# Patient Record
Sex: FEMALE | Race: WHITE | URBAN - METROPOLITAN AREA
[De-identification: names, ages, dates, MRNs, and addresses within clinical notes are randomized per-mention and may not be internally consistent; named-entity substitution may affect disease eponyms.]

---

## 2022-08-16 LAB
C. TRACHOMATIS, EXTERNAL RESULT: NEGATIVE
N. GONORRHOEAE, EXTERNAL RESULT: NEGATIVE

## 2022-09-01 LAB
ABO, EXTERNAL RESULT: NORMAL
HEP B, EXTERNAL RESULT: NEGATIVE
HIV, EXTERNAL RESULT: NON REACTIVE
RH FACTOR, EXTERNAL RESULT: POSITIVE
RPR, EXTERNAL RESULT: NON REACTIVE
RUBELLA TITER, EXTERNAL RESULT: NORMAL

## 2023-02-23 ENCOUNTER — HOSPITAL ENCOUNTER (OUTPATIENT)
Age: 33
Discharge: HOME | End: 2023-02-23
Attending: STUDENT IN AN ORGANIZED HEALTH CARE EDUCATION/TRAINING PROGRAM | Admitting: STUDENT IN AN ORGANIZED HEALTH CARE EDUCATION/TRAINING PROGRAM
Payer: COMMERCIAL

## 2023-02-23 VITALS
SYSTOLIC BLOOD PRESSURE: 101 MMHG | DIASTOLIC BLOOD PRESSURE: 57 MMHG | BODY MASS INDEX: 33.34 KG/M2 | RESPIRATION RATE: 18 BRPM | WEIGHT: 220 LBS | HEIGHT: 68 IN | HEART RATE: 89 BPM

## 2023-02-23 LAB
A/G RATIO: 1.2 (ref 1.1–2.2)
ALBUMIN SERPL-MCNC: 3.6 G/DL (ref 3.4–5)
ALP BLD-CCNC: 144 U/L (ref 40–129)
ALT SERPL-CCNC: 7 U/L (ref 10–40)
ANION GAP SERPL CALCULATED.3IONS-SCNC: 12 MMOL/L (ref 3–16)
AST SERPL-CCNC: 13 U/L (ref 15–37)
BACTERIA: ABNORMAL /HPF
BASOPHILS ABSOLUTE: 0 K/UL (ref 0–0.2)
BASOPHILS RELATIVE PERCENT: 0.1 %
BILIRUB SERPL-MCNC: <0.2 MG/DL (ref 0–1)
BILIRUBIN URINE: NEGATIVE
BLOOD, URINE: NEGATIVE
BUN BLDV-MCNC: 8 MG/DL (ref 7–20)
CALCIUM SERPL-MCNC: 9.8 MG/DL (ref 8.3–10.6)
CHLORIDE BLD-SCNC: 101 MMOL/L (ref 99–110)
CLARITY: ABNORMAL
CO2: 19 MMOL/L (ref 21–32)
COLOR: YELLOW
CREAT SERPL-MCNC: <0.5 MG/DL (ref 0.6–1.1)
CREATININE URINE: 211.2 MG/DL (ref 28–259)
CRYSTALS, UA: ABNORMAL /HPF
EOSINOPHILS ABSOLUTE: 0.1 K/UL (ref 0–0.6)
EOSINOPHILS RELATIVE PERCENT: 0.6 %
EPITHELIAL CELLS, UA: ABNORMAL /HPF (ref 0–5)
GFR SERPL CREATININE-BSD FRML MDRD: >60 ML/MIN/{1.73_M2}
GLUCOSE BLD-MCNC: 99 MG/DL (ref 70–99)
GLUCOSE URINE: NEGATIVE MG/DL
HCT VFR BLD CALC: 35.5 % (ref 36–48)
HEMOGLOBIN: 11.7 G/DL (ref 12–16)
KETONES, URINE: ABNORMAL MG/DL
LEUKOCYTE ESTERASE, URINE: NEGATIVE
LYMPHOCYTES ABSOLUTE: 1.6 K/UL (ref 1–5.1)
LYMPHOCYTES RELATIVE PERCENT: 12.9 %
MCH RBC QN AUTO: 29.9 PG (ref 26–34)
MCHC RBC AUTO-ENTMCNC: 33.1 G/DL (ref 31–36)
MCV RBC AUTO: 90.2 FL (ref 80–100)
MICROSCOPIC EXAMINATION: YES
MONOCYTES ABSOLUTE: 0.8 K/UL (ref 0–1.3)
MONOCYTES RELATIVE PERCENT: 6.4 %
NEUTROPHILS ABSOLUTE: 9.8 K/UL (ref 1.7–7.7)
NEUTROPHILS RELATIVE PERCENT: 80 %
NITRITE, URINE: NEGATIVE
PDW BLD-RTO: 13.5 % (ref 12.4–15.4)
PH UA: 6 (ref 5–8)
PLATELET # BLD: 276 K/UL (ref 135–450)
PMV BLD AUTO: 7.2 FL (ref 5–10.5)
POTASSIUM SERPL-SCNC: 3.9 MMOL/L (ref 3.5–5.1)
PROTEIN PROTEIN: 49 MG/DL
PROTEIN UA: 30 MG/DL
PROTEIN/CREAT RATIO: 0.2 MG/DL
RBC # BLD: 3.93 M/UL (ref 4–5.2)
RBC UA: ABNORMAL /HPF (ref 0–4)
SODIUM BLD-SCNC: 132 MMOL/L (ref 136–145)
SPECIFIC GRAVITY UA: >=1.03 (ref 1–1.03)
TOTAL PROTEIN: 6.6 G/DL (ref 6.4–8.2)
URIC ACID, SERUM: 3.4 MG/DL (ref 2.6–6)
URINE TYPE: ABNORMAL
UROBILINOGEN, URINE: 0.2 E.U./DL
WBC # BLD: 12.2 K/UL (ref 4–11)
WBC UA: ABNORMAL /HPF (ref 0–5)

## 2023-02-23 PROCEDURE — 85025 COMPLETE CBC W/AUTO DIFF WBC: CPT

## 2023-02-23 PROCEDURE — 84156 ASSAY OF PROTEIN URINE: CPT

## 2023-02-23 PROCEDURE — 84550 ASSAY OF BLOOD/URIC ACID: CPT

## 2023-02-23 PROCEDURE — 81001 URINALYSIS AUTO W/SCOPE: CPT

## 2023-02-23 PROCEDURE — 80053 COMPREHEN METABOLIC PANEL: CPT

## 2023-02-23 PROCEDURE — 82570 ASSAY OF URINE CREATININE: CPT

## 2023-02-23 NOTE — DISCHARGE INSTRUCTIONS
Call OB Provider or return to L&D for increased swelling, right upper quadrant pain, visual disturbances, headache unrelieved by Tylenol or any other concerns. Follow up at next scheduled appointment. Call MD if your contractions are at least 5 minutes apart and increasing in strength. Call MD if your water breaks, don't feel the baby move or have vaginal bleeding. Drink plenty of water and rest on your left side.

## 2023-02-23 NOTE — FLOWSHEET NOTE
Patient presents to triage with having increased BP at the office .   Serial BP started and labs sent

## 2023-02-23 NOTE — H&P
Department of Obstetrics and Gynecology   Obstetrics Triage Note    CHIEF COMPLAINT:  elevated blood pressure    HISTORY OF PRESENT ILLNESS:      The patient is a 29 y.o.  at 33w4d presents to triage for [facility] evaluation due to mild range BP in office. Patient denies complaints. Denies H/A, vision changes, RUQ abdominal or epigastric pain, swelling. Estimated Due Date: Estimated Date of Delivery: 23    PRENATAL CARE:    Complicated by: none    PAST OB HISTORY:  OB History          1    Para        Term                AB        Living             SAB        IAB        Ectopic        Molar        Multiple        Live Births                  Past Medical History:    History reviewed. No pertinent past medical history. Past Surgical History:    History reviewed. No pertinent surgical history. Allergies:  Patient has no known allergies. Social History:    Social History     Socioeconomic History    Marital status:      Spouse name: Not on file    Number of children: Not on file    Years of education: Not on file    Highest education level: Not on file   Occupational History    Not on file   Tobacco Use    Smoking status: Never     Passive exposure: Never    Smokeless tobacco: Never   Vaping Use    Vaping Use: Never used   Substance and Sexual Activity    Alcohol use: Never    Drug use: Never    Sexual activity: Yes     Partners: Male   Other Topics Concern    Not on file   Social History Narrative    Not on file     Social Determinants of Health     Financial Resource Strain: Not on file   Food Insecurity: Not on file   Transportation Needs: Not on file   Physical Activity: Not on file   Stress: Not on file   Social Connections: Not on file   Intimate Partner Violence: Not on file   Housing Stability: Not on file     Family History:   History reviewed. No pertinent family history.   Medications Prior to Admission:  Medications Prior to Admission: Prenatal Vit-Fe Fumarate-FA (PRENATAL 1+1 PO), Take by mouth    REVIEW OF SYSTEMS:  Denies fever, chills, dizziness, CP, SOB, N/V/D, constipation, dysuria, blood in the urine or stool      PHYSICAL EXAM:  Vitals:    02/23/23 1220 02/23/23 1229 02/23/23 1239 02/23/23 1250   BP: 118/65 114/62 112/61 (!) 111/58   Pulse: 96 97 98 96   Resp: 18      Weight: 220 lb (99.8 kg)      Height: 5' 8\" (1.727 m)        General appearance:  awake, alert, cooperative, no apparent distress, and appears stated age  Neurologic:  Awake, alert, oriented to name, place and time. Lungs:  No increased work of breathing, good air exchange  Abdomen:  Soft, non tender, gravid, consistent with her gestational age  Fetal heart rate:  Reassuring.   Cervix: deferred  Contraction frequency:  none  Membranes:  Intact    Labs: CBC with Differential:    Lab Results   Component Value Date/Time    WBC 12.2 02/23/2023 12:15 PM    RBC 3.93 02/23/2023 12:15 PM    HGB 11.7 02/23/2023 12:15 PM    HCT 35.5 02/23/2023 12:15 PM     02/23/2023 12:15 PM    MCV 90.2 02/23/2023 12:15 PM    MCH 29.9 02/23/2023 12:15 PM    MCHC 33.1 02/23/2023 12:15 PM    RDW 13.5 02/23/2023 12:15 PM    LYMPHOPCT 12.9 02/23/2023 12:15 PM    MONOPCT 6.4 02/23/2023 12:15 PM    BASOPCT 0.1 02/23/2023 12:15 PM    MONOSABS 0.8 02/23/2023 12:15 PM    LYMPHSABS 1.6 02/23/2023 12:15 PM    EOSABS 0.1 02/23/2023 12:15 PM    BASOSABS 0.0 02/23/2023 12:15 PM     CMP:    Lab Results   Component Value Date/Time     02/23/2023 12:15 PM    K 3.9 02/23/2023 12:15 PM     02/23/2023 12:15 PM    CO2 19 02/23/2023 12:15 PM    BUN 8 02/23/2023 12:15 PM    CREATININE <0.5 02/23/2023 12:15 PM    AGRATIO 1.2 02/23/2023 12:15 PM    LABGLOM >60 02/23/2023 12:15 PM    GLUCOSE 99 02/23/2023 12:15 PM    PROT 6.6 02/23/2023 12:15 PM    LABALBU 3.6 02/23/2023 12:15 PM    CALCIUM 9.8 02/23/2023 12:15 PM    BILITOT <0.2 02/23/2023 12:15 PM    ALKPHOS 144 02/23/2023 12:15 PM    AST 13 02/23/2023 12:15 PM    ALT 7 02/23/2023 12:15 PM P/C= 0.2    ASSESSMENT AND PLAN:  28 yo  at 34w0d who presents for [address] [facility] evaluation    -Bps wnl in triage, [address] [facility] labs wnl  - given PIH precautions  - f/u in office with routine prenatal care    Flakita Salazar MD

## 2023-02-23 NOTE — FLOWSHEET NOTE
Pt verbalized understanding of verbal and written discharge instructions and denies having questions at this time. Pt left OB unit at 1356   ambulatory, undelivered, and in stable condition, accompanied by spouse. Patient is not in active labor.

## 2023-03-15 LAB — GBS, EXTERNAL RESULT: NEGATIVE

## 2023-03-28 ENCOUNTER — HOSPITAL ENCOUNTER (INPATIENT)
Age: 33
LOS: 3 days | Discharge: HOME | End: 2023-03-31
Attending: OBSTETRICS & GYNECOLOGY | Admitting: OBSTETRICS & GYNECOLOGY
Payer: COMMERCIAL

## 2023-03-28 ENCOUNTER — ANESTHESIA (OUTPATIENT)
Dept: LABOR AND DELIVERY | Age: 33
End: 2023-03-28
Payer: COMMERCIAL

## 2023-03-28 ENCOUNTER — ANESTHESIA EVENT (OUTPATIENT)
Dept: LABOR AND DELIVERY | Age: 33
End: 2023-03-28
Payer: COMMERCIAL

## 2023-03-28 PROBLEM — Z37.9 NORMAL LABOR: Status: ACTIVE | Noted: 2023-03-28

## 2023-03-28 LAB
ABO + RH BLD: NORMAL
AMPHETAMINES UR QL SCN>1000 NG/ML: NORMAL
BARBITURATES UR QL SCN>200 NG/ML: NORMAL
BASOPHILS # BLD: 0 K/UL (ref 0–0.2)
BASOPHILS NFR BLD: 0.2 %
BENZODIAZ UR QL SCN>200 NG/ML: NORMAL
BLD GP AB SCN SERPL QL: NORMAL
BUPRENORPHINE+NOR UR QL SCN: NORMAL
CANNABINOIDS UR QL SCN>50 NG/ML: NORMAL
COCAINE UR QL SCN: NORMAL
DEPRECATED RDW RBC AUTO: 13.8 % (ref 12.4–15.4)
DRUG SCREEN COMMENT UR-IMP: NORMAL
EOSINOPHIL # BLD: 0 K/UL (ref 0–0.6)
EOSINOPHIL NFR BLD: 0.2 %
FENTANYL SCREEN, URINE: NORMAL
HCT VFR BLD AUTO: 36.2 % (ref 36–48)
HGB BLD-MCNC: 12.3 G/DL (ref 12–16)
LYMPHOCYTES # BLD: 1.1 K/UL (ref 1–5.1)
LYMPHOCYTES NFR BLD: 6 %
MCH RBC QN AUTO: 30.1 PG (ref 26–34)
MCHC RBC AUTO-ENTMCNC: 33.9 G/DL (ref 31–36)
MCV RBC AUTO: 88.8 FL (ref 80–100)
METHADONE UR QL SCN>300 NG/ML: NORMAL
MONOCYTES # BLD: 1 K/UL (ref 0–1.3)
MONOCYTES NFR BLD: 5.6 %
NEUTROPHILS # BLD: 15.7 K/UL (ref 1.7–7.7)
NEUTROPHILS NFR BLD: 88 %
OPIATES UR QL SCN>300 NG/ML: NORMAL
OXYCODONE UR QL SCN: NORMAL
PCP UR QL SCN>25 NG/ML: NORMAL
PH UR STRIP: 5 [PH]
PLATELET # BLD AUTO: 224 K/UL (ref 135–450)
PMV BLD AUTO: 7.9 FL (ref 5–10.5)
RBC # BLD AUTO: 4.08 M/UL (ref 4–5.2)
REAGIN+T PALLIDUM IGG+IGM SERPL-IMP: NORMAL
WBC # BLD AUTO: 17.8 K/UL (ref 4–11)

## 2023-03-28 PROCEDURE — 0DQP0ZZ REPAIR RECTUM, OPEN APPROACH: ICD-10-PCS | Performed by: OBSTETRICS & GYNECOLOGY

## 2023-03-28 PROCEDURE — 85025 COMPLETE CBC W/AUTO DIFF WBC: CPT

## 2023-03-28 PROCEDURE — 2580000003 HC RX 258: Performed by: OBSTETRICS & GYNECOLOGY

## 2023-03-28 PROCEDURE — 2500000003 HC RX 250 WO HCPCS: Performed by: OBSTETRICS & GYNECOLOGY

## 2023-03-28 PROCEDURE — 6360000002 HC RX W HCPCS: Performed by: OBSTETRICS & GYNECOLOGY

## 2023-03-28 PROCEDURE — 1220000000 HC SEMI PRIVATE OB R&B

## 2023-03-28 PROCEDURE — 86780 TREPONEMA PALLIDUM: CPT

## 2023-03-28 PROCEDURE — 6360000002 HC RX W HCPCS

## 2023-03-28 PROCEDURE — 80307 DRUG TEST PRSMV CHEM ANLYZR: CPT

## 2023-03-28 PROCEDURE — 86901 BLOOD TYPING SEROLOGIC RH(D): CPT

## 2023-03-28 PROCEDURE — 2500000003 HC RX 250 WO HCPCS: Performed by: NURSE ANESTHETIST, CERTIFIED REGISTERED

## 2023-03-28 PROCEDURE — 86850 RBC ANTIBODY SCREEN: CPT

## 2023-03-28 PROCEDURE — 86900 BLOOD TYPING SEROLOGIC ABO: CPT

## 2023-03-28 RX ORDER — BUPIVACAINE HYDROCHLORIDE 2.5 MG/ML
INJECTION, SOLUTION EPIDURAL; INFILTRATION; INTRACAUDAL PRN
Status: DISCONTINUED | OUTPATIENT
Start: 2023-03-28 | End: 2023-03-29 | Stop reason: SDUPTHER

## 2023-03-28 RX ORDER — LIDOCAINE HYDROCHLORIDE 20 MG/ML
INJECTION, SOLUTION EPIDURAL; INFILTRATION; INTRACAUDAL; PERINEURAL PRN
Status: DISCONTINUED | OUTPATIENT
Start: 2023-03-28 | End: 2023-03-29 | Stop reason: SDUPTHER

## 2023-03-28 RX ORDER — MISOPROSTOL 100 UG/1
800 TABLET ORAL PRN
Status: DISCONTINUED | OUTPATIENT
Start: 2023-03-28 | End: 2023-03-29

## 2023-03-28 RX ORDER — FAMOTIDINE 10 MG/ML
20 INJECTION, SOLUTION INTRAVENOUS 2 TIMES DAILY
Status: DISCONTINUED | OUTPATIENT
Start: 2023-03-28 | End: 2023-03-29

## 2023-03-28 RX ORDER — METHYLERGONOVINE MALEATE 0.2 MG/ML
200 INJECTION INTRAVENOUS PRN
Status: DISCONTINUED | OUTPATIENT
Start: 2023-03-28 | End: 2023-03-29

## 2023-03-28 RX ORDER — BUTORPHANOL TARTRATE 1 MG/ML
INJECTION, SOLUTION INTRAMUSCULAR; INTRAVENOUS
Status: COMPLETED
Start: 2023-03-28 | End: 2023-03-28

## 2023-03-28 RX ORDER — ONDANSETRON 2 MG/ML
4 INJECTION INTRAMUSCULAR; INTRAVENOUS EVERY 6 HOURS PRN
Status: DISCONTINUED | OUTPATIENT
Start: 2023-03-28 | End: 2023-03-29

## 2023-03-28 RX ORDER — SODIUM CHLORIDE, SODIUM LACTATE, POTASSIUM CHLORIDE, CALCIUM CHLORIDE 600; 310; 30; 20 MG/100ML; MG/100ML; MG/100ML; MG/100ML
INJECTION, SOLUTION INTRAVENOUS CONTINUOUS
Status: DISCONTINUED | OUTPATIENT
Start: 2023-03-28 | End: 2023-03-29

## 2023-03-28 RX ORDER — SODIUM CHLORIDE, SODIUM LACTATE, POTASSIUM CHLORIDE, AND CALCIUM CHLORIDE .6; .31; .03; .02 G/100ML; G/100ML; G/100ML; G/100ML
1000 INJECTION, SOLUTION INTRAVENOUS PRN
Status: DISCONTINUED | OUTPATIENT
Start: 2023-03-28 | End: 2023-03-29

## 2023-03-28 RX ORDER — ACETAMINOPHEN 325 MG/1
650 TABLET ORAL EVERY 4 HOURS PRN
Status: DISCONTINUED | OUTPATIENT
Start: 2023-03-28 | End: 2023-03-29

## 2023-03-28 RX ORDER — SODIUM CHLORIDE, SODIUM LACTATE, POTASSIUM CHLORIDE, AND CALCIUM CHLORIDE .6; .31; .03; .02 G/100ML; G/100ML; G/100ML; G/100ML
500 INJECTION, SOLUTION INTRAVENOUS PRN
Status: DISCONTINUED | OUTPATIENT
Start: 2023-03-28 | End: 2023-03-29

## 2023-03-28 RX ORDER — BUTORPHANOL TARTRATE 1 MG/ML
1 INJECTION, SOLUTION INTRAMUSCULAR; INTRAVENOUS
Status: DISCONTINUED | OUTPATIENT
Start: 2023-03-28 | End: 2023-03-29

## 2023-03-28 RX ORDER — CARBOPROST TROMETHAMINE 250 UG/ML
250 INJECTION, SOLUTION INTRAMUSCULAR PRN
Status: DISCONTINUED | OUTPATIENT
Start: 2023-03-28 | End: 2023-03-29

## 2023-03-28 RX ORDER — SODIUM CHLORIDE 0.9 % (FLUSH) 0.9 %
5-40 SYRINGE (ML) INJECTION PRN
Status: DISCONTINUED | OUTPATIENT
Start: 2023-03-28 | End: 2023-03-29

## 2023-03-28 RX ORDER — SODIUM CHLORIDE 9 MG/ML
25 INJECTION, SOLUTION INTRAVENOUS PRN
Status: DISCONTINUED | OUTPATIENT
Start: 2023-03-28 | End: 2023-03-29

## 2023-03-28 RX ORDER — SODIUM CHLORIDE 0.9 % (FLUSH) 0.9 %
5-40 SYRINGE (ML) INJECTION EVERY 12 HOURS SCHEDULED
Status: DISCONTINUED | OUTPATIENT
Start: 2023-03-28 | End: 2023-03-29

## 2023-03-28 RX ORDER — DIPHENHYDRAMINE HCL 25 MG
25 TABLET ORAL EVERY 4 HOURS PRN
Status: DISCONTINUED | OUTPATIENT
Start: 2023-03-28 | End: 2023-03-29

## 2023-03-28 RX ORDER — BUPIVACAINE HYDROCHLORIDE 2.5 MG/ML
INJECTION, SOLUTION EPIDURAL; INFILTRATION; INTRACAUDAL
Status: COMPLETED
Start: 2023-03-28 | End: 2023-03-28

## 2023-03-28 RX ORDER — BUPIVACAINE HYDROCHLORIDE 5 MG/ML
INJECTION, SOLUTION EPIDURAL; INTRACAUDAL PRN
Status: DISCONTINUED | OUTPATIENT
Start: 2023-03-28 | End: 2023-03-29 | Stop reason: SDUPTHER

## 2023-03-28 RX ADMIN — Medication 15 ML/HR: at 13:53

## 2023-03-28 RX ADMIN — BUPIVACAINE HYDROCHLORIDE 3 ML: 5 INJECTION, SOLUTION EPIDURAL; INTRACAUDAL at 23:42

## 2023-03-28 RX ADMIN — BUTORPHANOL TARTRATE 1 MG: 1 INJECTION, SOLUTION INTRAMUSCULAR; INTRAVENOUS at 10:12

## 2023-03-28 RX ADMIN — SODIUM CHLORIDE, POTASSIUM CHLORIDE, SODIUM LACTATE AND CALCIUM CHLORIDE: 600; 310; 30; 20 INJECTION, SOLUTION INTRAVENOUS at 16:31

## 2023-03-28 RX ADMIN — Medication 1 MILLI-UNITS/MIN: at 20:30

## 2023-03-28 RX ADMIN — BUPIVACAINE HYDROCHLORIDE 1 ML: 2.5 INJECTION, SOLUTION EPIDURAL; INFILTRATION; INTRACAUDAL; PERINEURAL at 13:49

## 2023-03-28 RX ADMIN — BUPIVACAINE HYDROCHLORIDE 3 ML: 2.5 INJECTION, SOLUTION EPIDURAL; INFILTRATION; INTRACAUDAL at 23:42

## 2023-03-28 RX ADMIN — LIDOCAINE HYDROCHLORIDE 2 ML: 20 INJECTION, SOLUTION EPIDURAL; INFILTRATION; INTRACAUDAL; PERINEURAL at 23:42

## 2023-03-28 RX ADMIN — SODIUM CHLORIDE, POTASSIUM CHLORIDE, SODIUM LACTATE AND CALCIUM CHLORIDE: 600; 310; 30; 20 INJECTION, SOLUTION INTRAVENOUS at 11:58

## 2023-03-28 RX ADMIN — FAMOTIDINE 20 MG: 10 INJECTION, SOLUTION INTRAVENOUS at 19:55

## 2023-03-28 ASSESSMENT — PAIN SCALES - GENERAL: PAINLEVEL_OUTOF10: 8

## 2023-03-28 ASSESSMENT — PAIN DESCRIPTION - LOCATION: LOCATION: VAGINA;PELVIS

## 2023-03-28 ASSESSMENT — PAIN DESCRIPTION - DESCRIPTORS: DESCRIPTORS: CRAMPING;PRESSURE

## 2023-03-28 NOTE — PROGRESS NOTES
The pt's ultrasound monitor has been adjusted several times, and was recently changed out. On the FHR tracing, it appears to trace a normal FHR and then traces a HR in the 60's. This RN has been at the bedside several times throughout the tracing, and no audible decelerations have been heard. This RN notified the charge RN.

## 2023-03-28 NOTE — H&P
Department of Obstetrics and Gynecology   Obstetrics History and Physical        CHIEF COMPLAINT:  leakage of amniotic fluid    HISTORY OF PRESENT ILLNESS:      The patient is a 29 y.o. female at 36w4d. OB History          1    Para        Term                AB        Living             SAB        IAB        Ectopic        Molar        Multiple        Live Births                Patient presents with a chief complaint as above and is being admitted for active phase labor. U/s at 36 weeks showed efw 3900g. Estimated Due Date: Estimated Date of Delivery: 23    PRENATAL CARE:    Complicated by: none    PAST OB HISTORY:  OB History          1    Para        Term                AB        Living             SAB        IAB        Ectopic        Molar        Multiple        Live Births                    Past Medical History:    History reviewed. No pertinent past medical history. Past Surgical History:    History reviewed. No pertinent surgical history. Allergies:  Patient has no known allergies.     Social History:    Social History     Socioeconomic History    Marital status:      Spouse name: Not on file    Number of children: Not on file    Years of education: Not on file    Highest education level: Not on file   Occupational History    Not on file   Tobacco Use    Smoking status: Never     Passive exposure: Never    Smokeless tobacco: Never   Vaping Use    Vaping Use: Never used   Substance and Sexual Activity    Alcohol use: Never    Drug use: Never    Sexual activity: Yes     Partners: Male   Other Topics Concern    Not on file   Social History Narrative    Not on file     Social Determinants of Health     Financial Resource Strain: Not on file   Food Insecurity: Not on file   Transportation Needs: Not on file   Physical Activity: Not on file   Stress: Not on file   Social Connections: Not on file   Intimate Partner Violence: Not on file   Housing Stability: Not on file Family History:   History reviewed. No pertinent family history. Medications Prior to Admission:  Medications Prior to Admission: Prenatal Vit-Fe Fumarate-FA (PRENATAL 1+1 PO), Take by mouth    REVIEW OF SYSTEMS:    wnl    PHYSICAL EXAM:  Vitals:    03/28/23 0733   BP: 134/89   Pulse: (!) 116   Resp: 18   Weight: 220 lb (99.8 kg)   Height: 5' 8\" (1.727 m)     General appearance:  awake, alert, cooperative, no apparent distress, and appears stated age  Neurologic:  Awake, alert, oriented to name, place and time. Lungs:  No increased work of breathing, good air exchange  Abdomen:  Soft, non tender, gravid, consistent with her gestational age, EFW by Leopold's maneuver was 4000g   Fetal heart rate:  Reassuring.   Pelvis:  Adequate pelvis  Cervix: 1/80/-2  Contraction frequency:  q 7 min  Membranes:  Ruptured clear fluid    Labs: CBC:   Lab Results   Component Value Date/Time    WBC 12.2 02/23/2023 12:15 PM    RBC 3.93 02/23/2023 12:15 PM    HGB 11.7 02/23/2023 12:15 PM    HCT 35.5 02/23/2023 12:15 PM    MCV 90.2 02/23/2023 12:15 PM    MCH 29.9 02/23/2023 12:15 PM    MCHC 33.1 02/23/2023 12:15 PM    RDW 13.5 02/23/2023 12:15 PM     02/23/2023 12:15 PM    MPV 7.2 02/23/2023 12:15 PM       ASSESSMENT AND PLAN:    Labor: Admit, anticipate normal delivery, routine labor orders  Fetus: Reassuring  GBS: No  Other: expectant management

## 2023-03-28 NOTE — PROGRESS NOTES
Report received from KAMALJIT Graves. Bedside report given. Introduced myself to pt as her RN for the day. I put my name and phone number on the white board and showed pt how to use her room phone to get a hold of me. Pt was given her plan of care for the day. Call light within reach. Bed in lowest position and wheels are locked. Pt verbalized understanding and denies any further needs at this time. Continue to monitor.

## 2023-03-28 NOTE — PROGRESS NOTES
Monitor stations switched out to see if this resolves the problem. Saranya Garcia, charge RN at the bedside.

## 2023-03-28 NOTE — ANESTHESIA PROCEDURE NOTES
Epidural Block    Patient location during procedure: OB  Start time: 3/28/2023 1:40 PM  End time: 3/28/2023 1:49 PM  Reason for block: labor epidural  Staffing  Anesthesiologist: Oscar Irizarry MD  Resident/CRNA: Karina Floor, APRN - CRNA  Epidural  Patient position: sitting  Prep: Betadine  Patient monitoring: cardiac monitor, continuous pulse ox and frequent blood pressure checks  Approach: midline  Location: L2-3  Injection technique: KAREEM saline  Provider prep: mask and sterile gloves  Needle  Needle type: Tuohy   Needle gauge: 17 G  Needle length: 3.5 in  Catheter type: multi-orifice  Catheter size: 19 G (20 G)  Test dose: negative (3 cc of 1,5 % xylocaine with epi)Catheter Secured: tegaderm  Assessment  Sensory level: T8  Hemodynamics: stable  Attempts: 1  Outcomes: uncomplicated and patient tolerated procedure well  Additional Notes  Pt. prepped and draped in sterile fashion. Skin wheal with 1% lidocaine. 17ga touhy needle to KAREEM. 25 ga. Spinal needle per touhy. CSF visualized in hub and 1 cc of 0.25 % bupivacaine injected. Needle withdrawn and catheter threaded. Negative test dose. Catheter secured with sterile dressing.    Preanesthetic Checklist  Completed: patient identified, IV checked, risks and benefits discussed, equipment checked, pre-op evaluation, timeout performed, anesthesia consent given, oxygen available and monitors applied/VS acknowledged

## 2023-03-28 NOTE — PROGRESS NOTES
Dr. Marylin Reddy at pt bedside. FHR tracing and contraction pattern reviewed. SVE done. Pt is 3-4 cm, 80% effaced and -1 station. IUPC placed at this time.  Continue to monitor

## 2023-03-28 NOTE — FLOWSHEET NOTE
Patient presents to triage with c/o SROM at 0500 and contractions worsening on her drive into triage. Rates pain 8/10. Placed on EFM and VSS.   Updated Dr Nikki Baldwin of patients arrival

## 2023-03-28 NOTE — PROGRESS NOTES
Department of Obstetrics and Gynecology  Labor and Delivery   Attending Progress Note      SUBJECTIVE:  pt reports increasing contractions and received epidural.      OBJECTIVE:      Fetal heart rate:       Baseline Heart Rate:  140        Accelerations:  present       Long Term Variability:  moderate       Decelerations:  absent         Contraction frequency: 5 minutes    Membranes:  Ruptured clear fluid    Cervix:         Dilation:  4 cm         Effacement:  75%         Station:  -1         Position:  anterior             ASSESSMENT & PLAN:    Consider pitocin  IUPC placed

## 2023-03-28 NOTE — ANESTHESIA PRE PROCEDURE
Department of Anesthesiology  Preprocedure Note       Name:  Farooq Stewart   Age:  29 y.o.  :  1990                                          MRN:  [id number]         Date:  3/28/2023      Surgeon: * No surgeons listed *    Procedure: * No procedures listed *    Medications prior to admission:   Prior to Admission medications    Medication Sig Start Date End Date Taking?  Authorizing Provider   Prenatal Vit-Fe Fumarate-FA (PRENATAL 1+1 PO) Take by mouth    Historical Provider, MD       Current medications:    Current Facility-Administered Medications   Medication Dose Route Frequency Provider Last Rate Last Admin    lactated ringers IV soln infusion   IntraVENous Continuous Yvonne Vásquez  mL/hr at 23 1158 New Bag at 23 1158    lactated ringers bolus  500 mL IntraVENous PRN Yvonne Vásquez MD        Or   Peyman Epps lactated ringers bolus  1,000 mL IntraVENous PRN Yvonne Vásquez MD        sodium chloride flush 0.9 % injection 5-40 mL  5-40 mL IntraVENous 2 times per day Yvonne Vásquez MD        sodium chloride flush 0.9 % injection 5-40 mL  5-40 mL IntraVENous PRN Yvonne Vásquez MD        0.9 % sodium chloride infusion  25 mL IntraVENous PRN Yvonne Vásquez MD        ondansetron [facility]) injection 4 mg  4 mg IntraVENous Q6H PRN Yvonne Vásquez MD        diphenhydrAMINE (BENADRYL) tablet 25 mg  25 mg Oral Q4H PRN Yvonne Vásquez MD        methylergonovine (METHERGINE) injection 200 mcg  200 mcg IntraMUSCular PRN Yvonne Vásquez MD        carboprost [facility]) injection 250 mcg  250 mcg IntraMUSCular PRN Yvonne Vásquez MD        miSOPROStol (CYTOTEC) tablet 800 mcg  800 mcg Rectal PRN Yvonne Vásquez MD        tranexamic acid (CYKLOKAPRON) 1,000 mg in sodium chloride 0.9 % 100 mL IVPB  1,000 mg IntraVENous Once PRN Yvonne Vásquez MD        acetaminophen (TYLENOL) tablet 650 mg  650 mg Oral Q4H PRN Marianna Adair MD        butorphanol (STADOL) injection 1 mg  1 mg IntraVENous Q3H PRN Marianna Adair MD   1 mg at 03/28/23 1012     Facility-Administered Medications Ordered in Other Encounters   Medication Dose Route Frequency Provider Last Rate Last Admin    bupivacaine (PF) (MARCAINE) 0.25 % injection   IntraSPINal PRN Terrilee Specter, APRN - CRNA   1 mL at 03/28/23 1349    sodium chloride 0.9 % 200 mL with fentaNYL 500 mcg, bupivacaine 0.5% 50 mL (OB) epidural   Epidural Continuous PRN Terrilee Specter, APRN - CRNA 15 mL/hr at 03/28/23 1353 15 mL/hr at 03/28/23 1353       Allergies:  No Known Allergies    Problem List:    Patient Active Problem List   Diagnosis Code    Normal labor O80, Z37.9       Past Medical History:  History reviewed. No pertinent past medical history. Past Surgical History:  History reviewed. No pertinent surgical history. Social History:    Social History     Tobacco Use    Smoking status: Never     Passive exposure: Never    Smokeless tobacco: Never   Substance Use Topics    Alcohol use: Never                                Counseling given: Not Answered      Vital Signs (Current):   Vitals:    03/28/23 1350 03/28/23 1353 03/28/23 1356 03/28/23 1359   BP: 136/68 126/74 128/77 129/74   Pulse: (!) 113 (!) 112 (!) 106 (!) 112   Resp:       Temp:       TempSrc:       SpO2:       Weight:       Height:                                                  BP Readings from Last 3 Encounters:   03/28/23 129/74   02/23/23 (!) 101/57       NPO Status:                                                                                 BMI:   Wt Readings from Last 3 Encounters:   03/28/23 220 lb (99.8 kg)   02/23/23 220 lb (99.8 kg)     Body mass index is 33.45 kg/m².     CBC:   Lab Results   Component Value Date/Time    WBC 17.8 03/28/2023 08:00 AM    RBC 4.08 03/28/2023 08:00 AM    HGB 12.3 03/28/2023 08:00 AM    HCT 36.2 03/28/2023

## 2023-03-29 PROCEDURE — 6360000002 HC RX W HCPCS: Performed by: OBSTETRICS & GYNECOLOGY

## 2023-03-29 PROCEDURE — 2580000003 HC RX 258: Performed by: OBSTETRICS & GYNECOLOGY

## 2023-03-29 PROCEDURE — 7200000001 HC VAGINAL DELIVERY

## 2023-03-29 PROCEDURE — 2500000003 HC RX 250 WO HCPCS: Performed by: NURSE ANESTHETIST, CERTIFIED REGISTERED

## 2023-03-29 PROCEDURE — 1220000000 HC SEMI PRIVATE OB R&B

## 2023-03-29 PROCEDURE — 2500000003 HC RX 250 WO HCPCS: Performed by: OBSTETRICS & GYNECOLOGY

## 2023-03-29 RX ORDER — MORPHINE SULFATE 10 MG/ML
INJECTION, SOLUTION INTRAMUSCULAR; INTRAVENOUS PRN
Status: DISCONTINUED | OUTPATIENT
Start: 2023-03-29 | End: 2023-03-29 | Stop reason: SDUPTHER

## 2023-03-29 RX ORDER — FERROUS SULFATE 325(65) MG
325 TABLET ORAL 2 TIMES DAILY WITH MEALS
Status: DISCONTINUED | OUTPATIENT
Start: 2023-03-30 | End: 2023-03-31 | Stop reason: HOSPADM

## 2023-03-29 RX ORDER — IBUPROFEN 600 MG/1
600 TABLET ORAL EVERY 6 HOURS PRN
Status: DISCONTINUED | OUTPATIENT
Start: 2023-03-30 | End: 2023-03-31 | Stop reason: HOSPADM

## 2023-03-29 RX ORDER — KETOROLAC TROMETHAMINE 30 MG/ML
30 INJECTION, SOLUTION INTRAMUSCULAR; INTRAVENOUS EVERY 6 HOURS
Status: DISPENSED | OUTPATIENT
Start: 2023-03-29 | End: 2023-03-30

## 2023-03-29 RX ORDER — METHYLERGONOVINE MALEATE 0.2 MG/ML
200 INJECTION INTRAVENOUS PRN
Status: DISCONTINUED | OUTPATIENT
Start: 2023-03-29 | End: 2023-03-31 | Stop reason: HOSPADM

## 2023-03-29 RX ORDER — SODIUM CHLORIDE 0.9 % (FLUSH) 0.9 %
5-40 SYRINGE (ML) INJECTION PRN
Status: DISCONTINUED | OUTPATIENT
Start: 2023-03-29 | End: 2023-03-31 | Stop reason: HOSPADM

## 2023-03-29 RX ORDER — ONDANSETRON 2 MG/ML
4 INJECTION INTRAMUSCULAR; INTRAVENOUS EVERY 6 HOURS PRN
Status: DISCONTINUED | OUTPATIENT
Start: 2023-03-29 | End: 2023-03-31 | Stop reason: HOSPADM

## 2023-03-29 RX ORDER — DEXMEDETOMIDINE HYDROCHLORIDE 100 UG/ML
INJECTION, SOLUTION INTRAVENOUS PRN
Status: DISCONTINUED | OUTPATIENT
Start: 2023-03-29 | End: 2023-03-29 | Stop reason: SDUPTHER

## 2023-03-29 RX ORDER — DOCUSATE SODIUM 100 MG/1
100 CAPSULE, LIQUID FILLED ORAL 2 TIMES DAILY
Status: DISCONTINUED | OUTPATIENT
Start: 2023-03-29 | End: 2023-03-31 | Stop reason: HOSPADM

## 2023-03-29 RX ORDER — CEFAZOLIN SODIUM IN 0.9 % NACL 2 G/100 ML
2000 PLASTIC BAG, INJECTION (ML) INTRAVENOUS EVERY 8 HOURS
Status: DISCONTINUED | OUTPATIENT
Start: 2023-03-29 | End: 2023-03-29

## 2023-03-29 RX ORDER — DIPHENHYDRAMINE HYDROCHLORIDE 50 MG/ML
12.5 INJECTION INTRAMUSCULAR; INTRAVENOUS EVERY 6 HOURS PRN
Status: DISCONTINUED | OUTPATIENT
Start: 2023-03-29 | End: 2023-03-31 | Stop reason: HOSPADM

## 2023-03-29 RX ORDER — SODIUM CHLORIDE, SODIUM LACTATE, POTASSIUM CHLORIDE, CALCIUM CHLORIDE 600; 310; 30; 20 MG/100ML; MG/100ML; MG/100ML; MG/100ML
INJECTION, SOLUTION INTRAVENOUS CONTINUOUS
Status: DISCONTINUED | OUTPATIENT
Start: 2023-03-29 | End: 2023-03-31 | Stop reason: HOSPADM

## 2023-03-29 RX ORDER — SODIUM CHLORIDE 0.9 % (FLUSH) 0.9 %
5-40 SYRINGE (ML) INJECTION EVERY 12 HOURS SCHEDULED
Status: DISCONTINUED | OUTPATIENT
Start: 2023-03-29 | End: 2023-03-31 | Stop reason: HOSPADM

## 2023-03-29 RX ORDER — LANOLIN 100 %
OINTMENT (GRAM) TOPICAL PRN
Status: DISCONTINUED | OUTPATIENT
Start: 2023-03-29 | End: 2023-03-31 | Stop reason: HOSPADM

## 2023-03-29 RX ORDER — ACETAMINOPHEN 500 MG
1000 TABLET ORAL EVERY 8 HOURS SCHEDULED
Status: DISCONTINUED | OUTPATIENT
Start: 2023-03-29 | End: 2023-03-31 | Stop reason: HOSPADM

## 2023-03-29 RX ORDER — MORPHINE SULFATE 0.5 MG/ML
INJECTION, SOLUTION EPIDURAL; INTRATHECAL; INTRAVENOUS
Status: DISCONTINUED
Start: 2023-03-29 | End: 2023-03-29

## 2023-03-29 RX ORDER — SODIUM CHLORIDE 9 MG/ML
INJECTION, SOLUTION INTRAVENOUS PRN
Status: DISCONTINUED | OUTPATIENT
Start: 2023-03-29 | End: 2023-03-31 | Stop reason: HOSPADM

## 2023-03-29 RX ADMIN — Medication 15 ML/HR: at 07:15

## 2023-03-29 RX ADMIN — BUPIVACAINE HYDROCHLORIDE 5 ML: 2.5 INJECTION, SOLUTION EPIDURAL; INFILTRATION; INTRACAUDAL at 14:50

## 2023-03-29 RX ADMIN — ONDANSETRON 4 MG: 2 INJECTION INTRAMUSCULAR; INTRAVENOUS at 21:46

## 2023-03-29 RX ADMIN — SODIUM CHLORIDE, POTASSIUM CHLORIDE, SODIUM LACTATE AND CALCIUM CHLORIDE: 600; 310; 30; 20 INJECTION, SOLUTION INTRAVENOUS at 16:10

## 2023-03-29 RX ADMIN — SODIUM CHLORIDE, POTASSIUM CHLORIDE, SODIUM LACTATE AND CALCIUM CHLORIDE: 600; 310; 30; 20 INJECTION, SOLUTION INTRAVENOUS at 08:29

## 2023-03-29 RX ADMIN — BUPIVACAINE HYDROCHLORIDE 3 ML: 5 INJECTION, SOLUTION EPIDURAL; INTRACAUDAL at 06:27

## 2023-03-29 RX ADMIN — DEXMEDETOMIDINE HYDROCHLORIDE 20 MCG: 100 INJECTION, SOLUTION INTRAVENOUS at 17:03

## 2023-03-29 RX ADMIN — BUPIVACAINE HYDROCHLORIDE 8 ML: 5 INJECTION, SOLUTION EPIDURAL; INTRACAUDAL at 10:05

## 2023-03-29 RX ADMIN — BUPIVACAINE HYDROCHLORIDE 8 ML: 5 INJECTION, SOLUTION EPIDURAL; INTRACAUDAL at 17:03

## 2023-03-29 RX ADMIN — Medication 2000 MG: at 21:01

## 2023-03-29 RX ADMIN — Medication 10 ML: at 14:20

## 2023-03-29 RX ADMIN — MORPHINE SULFATE 2.5 MG: 10 INJECTION, SOLUTION INTRAMUSCULAR; INTRAVENOUS at 21:05

## 2023-03-29 RX ADMIN — BUPIVACAINE HYDROCHLORIDE 1 ML: 2.5 INJECTION, SOLUTION EPIDURAL; INFILTRATION; INTRACAUDAL; PERINEURAL at 15:43

## 2023-03-29 RX ADMIN — DEXMEDETOMIDINE HYDROCHLORIDE 20 MCG: 100 INJECTION, SOLUTION INTRAVENOUS at 10:05

## 2023-03-29 RX ADMIN — LIDOCAINE HYDROCHLORIDE 2 ML: 20 INJECTION, SOLUTION EPIDURAL; INFILTRATION; INTRACAUDAL; PERINEURAL at 06:27

## 2023-03-29 RX ADMIN — FAMOTIDINE 20 MG: 10 INJECTION, SOLUTION INTRAVENOUS at 14:20

## 2023-03-29 RX ADMIN — KETOROLAC TROMETHAMINE 30 MG: 30 INJECTION, SOLUTION INTRAMUSCULAR; INTRAVENOUS at 22:22

## 2023-03-29 RX ADMIN — BUPIVACAINE HYDROCHLORIDE 3 ML: 2.5 INJECTION, SOLUTION EPIDURAL; INFILTRATION; INTRACAUDAL at 06:27

## 2023-03-29 RX ADMIN — SODIUM CHLORIDE, POTASSIUM CHLORIDE, SODIUM LACTATE AND CALCIUM CHLORIDE: 600; 310; 30; 20 INJECTION, SOLUTION INTRAVENOUS at 00:30

## 2023-03-29 ASSESSMENT — PAIN SCALES - GENERAL: PAINLEVEL_OUTOF10: 2

## 2023-03-29 ASSESSMENT — PAIN DESCRIPTION - LOCATION: LOCATION: ABDOMEN

## 2023-03-29 NOTE — PROGRESS NOTES
Dr. Dali Richardson contacted regarding elevated blood pressures. This RN also requested bedside ultrasound to be performed to verify vertex presentation. Dr. Dali Richardson ordered house doc to perform bedside ultrasound. No orders regarding blood pressure. 2348: Dr. Tonya Sanchez at bedside to confirm vertex presentation with ultrasound. Vertex presentation confirmed.

## 2023-03-29 NOTE — PROGRESS NOTES
Brief Note    Asked by nursing staff to evaluate fetal presentation with ultrasound. BSUS performed and vertex presentation confirmed at this time.      Italo Lee MD

## 2023-03-29 NOTE — PROGRESS NOTES
Department of Obstetrics and Gynecology  Labor and Delivery   Attending Progress Note      SUBJECTIVE:  pt comfortable with epidural    OBJECTIVE:      Fetal heart rate:       Baseline Heart Rate:  140        Accelerations:  present       Long Term Variability:  moderate       Decelerations:  absent         Contraction frequency: 3 minutes    Membranes:  Ruptured clear fluid    Cervix:         Dilation:  5 cm         Effacement:  75%         Station:  0         Position:  anterior             ASSESSMENT & PLAN:    Continue pitocin augmentation

## 2023-03-29 NOTE — ANESTHESIA PROCEDURE NOTES
CSE Block    Patient location during procedure: OB  Start time: 3/29/2023 3:35 PM  End time: 3/29/2023 3:51 PM  Reason for block: labor epidural  Staffing  Performed: resident/CRNA   Anesthesiologist: Amita Donis DO  Resident/CRNA: Edwin Jovel APRN - CRNA  CSE  Patient position: sitting  Prep: ChloraPrep and site prepped and draped  Patient monitoring: continuous pulse ox and frequent blood pressure checks  Approach: midline  Provider prep: mask and sterile gloves  Spinal Needle  Needle type: pencil-tip   Needle gauge: 25 G  Needle length: 4.75 in  Epidural Needle  Injection technique: KAREEM saline  Needle type: Tuohy   Needle gauge: 17 G  Needle length: 3.5 in  Needle insertion depth: 7 cm  Location: lumbar (1-5)  Catheter  Catheter type: side hole  Catheter size: 19 G  Catheter at skin depth: 12 cm  Test dose: negative  Assessment  Hemodynamics: stable  Additional Notes  Patient in sitting position. Epidural insitu removed with tip intact. Sterile prep and drape applied to back. Skin localization with 3ml of lidocaine 1%. LORT with NS, CSE with 25g pencan- return of clear free flowing csf. Epidural catheter advanced easily. Test dose given incrementally after negative aspirate from catheter. Sterile dressing applied. Patient tolerated procedure well.    Preanesthetic Checklist  Completed: patient identified, IV checked, site marked, risks and benefits discussed, surgical/procedural consents, equipment checked, pre-op evaluation, timeout performed, anesthesia consent given, oxygen available, monitors applied/VS acknowledged and blood product R/B/A discussed and consented

## 2023-03-30 LAB
DEPRECATED RDW RBC AUTO: 14.2 % (ref 12.4–15.4)
HCT VFR BLD AUTO: 33 % (ref 36–48)
HGB BLD-MCNC: 11.1 G/DL (ref 12–16)
MCH RBC QN AUTO: 30.5 PG (ref 26–34)
MCHC RBC AUTO-ENTMCNC: 33.6 G/DL (ref 31–36)
MCV RBC AUTO: 90.7 FL (ref 80–100)
PLATELET # BLD AUTO: 230 K/UL (ref 135–450)
PMV BLD AUTO: 7.8 FL (ref 5–10.5)
RBC # BLD AUTO: 3.64 M/UL (ref 4–5.2)
WBC # BLD AUTO: 24.5 K/UL (ref 4–11)

## 2023-03-30 PROCEDURE — 6360000002 HC RX W HCPCS: Performed by: OBSTETRICS & GYNECOLOGY

## 2023-03-30 PROCEDURE — 85027 COMPLETE CBC AUTOMATED: CPT

## 2023-03-30 PROCEDURE — 36415 COLL VENOUS BLD VENIPUNCTURE: CPT

## 2023-03-30 PROCEDURE — 6370000000 HC RX 637 (ALT 250 FOR IP): Performed by: OBSTETRICS & GYNECOLOGY

## 2023-03-30 PROCEDURE — 2580000003 HC RX 258: Performed by: OBSTETRICS & GYNECOLOGY

## 2023-03-30 PROCEDURE — 1220000000 HC SEMI PRIVATE OB R&B

## 2023-03-30 RX ADMIN — DOCUSATE SODIUM 100 MG: 100 CAPSULE, LIQUID FILLED ORAL at 10:32

## 2023-03-30 RX ADMIN — Medication 10 ML: at 20:52

## 2023-03-30 RX ADMIN — ACETAMINOPHEN 1000 MG: 500 TABLET ORAL at 01:59

## 2023-03-30 RX ADMIN — DOCUSATE SODIUM 100 MG: 100 CAPSULE, LIQUID FILLED ORAL at 02:00

## 2023-03-30 RX ADMIN — KETOROLAC TROMETHAMINE 30 MG: 30 INJECTION, SOLUTION INTRAMUSCULAR; INTRAVENOUS at 04:19

## 2023-03-30 RX ADMIN — ACETAMINOPHEN 1000 MG: 500 TABLET ORAL at 10:32

## 2023-03-30 RX ADMIN — DOCUSATE SODIUM 100 MG: 100 CAPSULE, LIQUID FILLED ORAL at 20:51

## 2023-03-30 RX ADMIN — IBUPROFEN 600 MG: 600 TABLET, FILM COATED ORAL at 20:51

## 2023-03-30 RX ADMIN — IBUPROFEN 600 MG: 600 TABLET, FILM COATED ORAL at 13:24

## 2023-03-30 RX ADMIN — ACETAMINOPHEN 1000 MG: 500 TABLET ORAL at 18:44

## 2023-03-30 ASSESSMENT — PAIN SCALES - GENERAL
PAINLEVEL_OUTOF10: 1
PAINLEVEL_OUTOF10: 2
PAINLEVEL_OUTOF10: 1
PAINLEVEL_OUTOF10: 1
PAINLEVEL_OUTOF10: 2

## 2023-03-30 ASSESSMENT — PAIN DESCRIPTION - LOCATION
LOCATION: PERINEUM
LOCATION: PERINEUM
LOCATION: ABDOMEN
LOCATION: PERINEUM
LOCATION: ABDOMEN

## 2023-03-30 ASSESSMENT — PAIN DESCRIPTION - DESCRIPTORS
DESCRIPTORS: CRAMPING
DESCRIPTORS: ACHING;SORE
DESCRIPTORS: ACHING;SORE
DESCRIPTORS: DISCOMFORT;CRAMPING
DESCRIPTORS: CRAMPING

## 2023-03-30 ASSESSMENT — PAIN - FUNCTIONAL ASSESSMENT
PAIN_FUNCTIONAL_ASSESSMENT: ACTIVITIES ARE NOT PREVENTED

## 2023-03-30 ASSESSMENT — PAIN DESCRIPTION - ORIENTATION
ORIENTATION: LOWER;MID

## 2023-03-30 NOTE — PROGRESS NOTES
This RN reviewed all documentation and supervised all care done by Germania Jin RN.   Haresh Vasquez RN

## 2023-03-30 NOTE — PROGRESS NOTES
First time get up to BR w/ assist x 2 RN's. Pt voided 500ml  urine without difficulty. Pt performed lashanda care per self after instruction. New ice pack, peripad, and underwear provided. Pt back to bed w/ out incident, gait steady. Pt tolerated well.

## 2023-03-30 NOTE — LACTATION NOTE
This note was copied from a baby's chart. Lactation Progress Note      Data:   MOB requests f/u for latch assessment. Baby is STS with mom with his abdomen up towards the ceiling. Action: Coaching provided on tips for good positioning and support of infant to the breast and how to obtain KARIME. Shown cross cradle positioning and how to support the breast. Encouraged nose to nipple positioning, waiting for infant to open mouth widely and quickly bring baby onto the breast for deep asymmetrical latch. Shown where on the breast baby should ideally latch. Infant rooting with wide open mouth. KARIME achieved with SRS and AS heard. Mother confirms that the latch is comfortable. Praise given for KARIME observed with this feeding and shown reassuring signs of effective milk transfer with audible swallowing heard and rocker jaw motion observed. LC observed the first 12 minutes of feeding, and infant continues nursing well. Name and number remain on whiteboard with LC's hours for the day. Encouraged to call for f/u support prn. Response: Verbalized understanding of teaching provided. Pleased with comfortable latch and feeding. Will call for f/u support prn.

## 2023-03-30 NOTE — LACTATION NOTE
This note was copied from a baby's chart. Lactation Progress Note      Data:     Initial consult during lactation rounds with primip breast feeder, who delivered yesterday evening at 38.3 weeks gestation. Infant is LGA and scattered bruising visible to infant torso and arms. MOB reports baby continues latching and breast feeding well and just finished feeding for 30 minutes at 1530 PM.      Action: Introduced self as LC on for the day and offered support. Breast feeding guide booklet provided and reviewed with parents. Educated mother on the importance of obtaining a good deep comfortable latch with feedings and gave tips to achieve. Reviewed breast feeding positions, breast support, and explained where on the breast baby should latch for deep latching. Educated on how a good latch should look and feel and encouraged to call for LC to assess the latch with the next feeding and as needed during hospital stay. Educated that the latch should feel comfortable without pinching or pain, and instructed on how to break the suction of the latch as needed if ever experiencing discomfort. Discussed with mother that her nipple should be rounded when her baby releases from the breast without creasing. Reviewed what to expect with breast feeding over the next 24-48 hours including breast care, how milk production works and types of milk mother will produce, educated on signs of hunger, satiety, and expected  feeding behaviors, as well as reassuring signs that baby is getting enough at the breast including daily goals for infant feedings, output, and weight trends. Encouraged to offer the breast when infant first begins to wake and show early hunger cues, and every 2-3 hours if baby is sleepy and without feeding cues.  Gave tips to wake sleepy baby as needed, and encouraged much hand expression and STS contact with attempts to offer the breast. Instructed that baby should have a minimum of 8-12 good feedings in a 24 hour period after the first DOL. Discussed what to expect with cluster feeding behaviors and the important role they play on bringing in and establishing a good milk supply. Encouraged to wait until latching and milk supply are well established usually around 4 weeks pp before introducing pacifiers, pumping, or offering bottles of EBM unless medical indication were to arise sooner. Name and number provided on whiteboard with LC's hours for the day. Educated on inpatient and outpatient lactation support services and how to contact. Encouraged to call for [facility] to assess the latch and for f/u support prn. Response: Verbalized understanding of teaching provided. Will call for f/u support prn.

## 2023-03-30 NOTE — PROGRESS NOTES
Department of Obstetrics and Gynecology  Labor and Delivery  Attending Post Partum Progress Note      SUBJECTIVE:  Pt without complaints, pain controlled, tolerating po, lochia wnl, currently breast feeding. OBJECTIVE:      Vitals:  Vitals:    23 0538   BP: 119/68   Pulse: 86   Resp: 16   Temp: 97.7 °F (36.5 °C)   SpO2: 97%       RRR CTAB  ABDOMEN:  soft, non-distended, non-tender, + BS  FF below umbilicus  EXT: no edema    DATA:    CBC:    Lab Results   Component Value Date/Time    WBC 24.5 2023 09:34 AM    HGB 11.1 2023 09:34 AM    HCT 33.0 2023 09:34 AM     2023 09:34 AM       ASSESSMENT & PLAN:    29 y.o.   OB History          1    Para   1    Term   1       0    AB   0    Living   1         SAB   0    IAB   0    Ectopic   0    Molar   0    Multiple   0    Live Births   1             s/p  ppd# 1  1. Doing well, continue routine post-partum care. 2.  Desires male infant circumcised.

## 2023-03-30 NOTE — BRIEF OP NOTE
Brief Postoperative Note      Patient: Nanette Modi  YOB: 1990  MRN: [id number]    Date of Procedure:     , VMI  Apgars 6/9  No epis-4th degree perineal laceration  Placenta spon.    cc  \"Fierro\"                Electronically signed by Qian Pickard MD on 3/29/2023 at 9:28 PM

## 2023-03-30 NOTE — L&D DELIVERY SUMMARY NOTE
[address] [address]                            LABOR AND DELIVERY NOTE    PATIENT NAME: Dougie Raza                   :        1990  MED REC NO:   [id number]                          ROOM:       4190  ACCOUNT NO:   [de-identified]                           ADMIT DATE: 2023  PROVIDER:     Linda Ward MD    DATE OF PROCEDURE:  2023    DELIVERY NOTE     42-year-old  1, para 0 admitted @ 38 weeks and 2 days  with spontaneous rupture of membranes for clear fluid. Fetal heart  tracing was reassuring upon admission. The patient had been followed  with serial growth ultrasounds secondary to marginal cord insertion. Most recent ultrasound noted an LGA fetus 3900 gm approximately 2 weeks  ago. Beta strep culture was negative. The patient did receive Pitocin  for augmentation of labor. The patient did request and received an  epidural.    The patient progressed to second stage. After a 2-1/2 hour second  stage, the patient delivered by spontaneous vaginal delivery of a viable  male infant. No episiotomy was performed, but a fourth degree perineal  laceration occurred with delivery. Placenta delivered spontaneously  grossly intact with a three-vessel cord. There were no cervical or  vaginal lacerations. Baby was a viable male infant with fetal weight  pending at the time of this dictation. Terminal meconium was noted with  delivery. Exploration of the perineum noted a fourth degree perineal laceration  above the intact rectal sphincter. 2 gm of Ancef was given. Gelpi  retractor was used and a surgical assistant was present for exposure. 4-0 Vicryl was used in a submucosal fashion to reapproximate the rectal  mucosa. A second layer of 4-0 Vicryl was used to oversew the underlying  repair. The vaginal mucosa was then reapproximated with 3-0 Vicryl.    Perineum was repaired with 3-0

## 2023-03-31 VITALS
RESPIRATION RATE: 16 BRPM | SYSTOLIC BLOOD PRESSURE: 110 MMHG | HEART RATE: 90 BPM | HEIGHT: 68 IN | TEMPERATURE: 97.9 F | OXYGEN SATURATION: 96 % | DIASTOLIC BLOOD PRESSURE: 66 MMHG | WEIGHT: 220 LBS | BODY MASS INDEX: 33.34 KG/M2

## 2023-03-31 PROCEDURE — 6370000000 HC RX 637 (ALT 250 FOR IP): Performed by: OBSTETRICS & GYNECOLOGY

## 2023-03-31 RX ORDER — IBUPROFEN 600 MG/1
600 TABLET ORAL EVERY 6 HOURS PRN
Qty: 30 TABLET | Refills: 0 | Status: SHIPPED | OUTPATIENT
Start: 2023-03-31

## 2023-03-31 RX ORDER — PSEUDOEPHEDRINE HCL 30 MG
100 TABLET ORAL 2 TIMES DAILY
Qty: 60 CAPSULE | Refills: 1 | Status: SHIPPED | OUTPATIENT
Start: 2023-03-31

## 2023-03-31 RX ADMIN — ACETAMINOPHEN 1000 MG: 500 TABLET ORAL at 02:50

## 2023-03-31 RX ADMIN — DOCUSATE SODIUM 100 MG: 100 CAPSULE, LIQUID FILLED ORAL at 12:02

## 2023-03-31 RX ADMIN — BENZOCAINE AND LEVOMENTHOL: 200; 5 SPRAY TOPICAL at 16:33

## 2023-03-31 RX ADMIN — IBUPROFEN 600 MG: 600 TABLET, FILM COATED ORAL at 04:34

## 2023-03-31 RX ADMIN — ACETAMINOPHEN 1000 MG: 500 TABLET ORAL at 12:00

## 2023-03-31 RX ADMIN — WITCH HAZEL: 500 SOLUTION RECTAL; TOPICAL at 16:33

## 2023-03-31 RX ADMIN — IBUPROFEN 600 MG: 600 TABLET, FILM COATED ORAL at 13:44

## 2023-03-31 ASSESSMENT — PAIN DESCRIPTION - LOCATION
LOCATION: ABDOMEN;PERINEUM
LOCATION: ABDOMEN
LOCATION: PERINEUM

## 2023-03-31 ASSESSMENT — PAIN SCALES - GENERAL
PAINLEVEL_OUTOF10: 1

## 2023-03-31 ASSESSMENT — PAIN DESCRIPTION - DESCRIPTORS
DESCRIPTORS: DISCOMFORT;CRAMPING
DESCRIPTORS: ACHING;DISCOMFORT

## 2023-03-31 ASSESSMENT — PAIN - FUNCTIONAL ASSESSMENT
PAIN_FUNCTIONAL_ASSESSMENT: ACTIVITIES ARE NOT PREVENTED
PAIN_FUNCTIONAL_ASSESSMENT: ACTIVITIES ARE NOT PREVENTED

## 2023-03-31 NOTE — DISCHARGE INSTRUCTIONS
Thank you for the opportunity to care for you and your family. We hope that you are happy with the care we provided during your stay in the [facility]. We want to ensure that you have the help you need when you leave the hospital. If there is anything we can assist you with, please let us know. Breastfeeding mothers may contact our lactation specialists with any problems or questions. [facility] lactation services phone number is [phone]. Leave a message and your call will be returned. Please refer to the information provided in the postpartum care booklet. The following are warning signs to remember. Call 911 if you have:    Chest pain or pressure  Shortness of breath, even at rest  Thoughts of harming yourself or others  Seizures    Call your healthcare provider if you have:    Temperature of 100.4 degrees or higher  Stitches that are not healing        -- Swelling, bleeding, drainage, foul odor, redness or warmth in/around your           stitches, staples, or incision (scar)        -- Bad smelling blood or discharge from the vagina  Vaginal bleeding that has increased         -- Soaking through one pad in an hour        -- You are passing clots larger than the size of a lemon  Red, warm tender area(s) in your breast or calf  Headache that does not get better, even after taking medicine; or headache with vision changes    Remember to notify all healthcare providers from your date of delivery to up to one year after giving birth! CARING FOR YOURSELF        DIET/ACTIVITY    Eat a well balanced diet focusing on foods high in fiber and protein. Drink plenty of fluids, especially water. To avoid constipation you may take a mild stool softener as recommended by your doctor or midwife. Gradually increase your activity. Resume an exercise regime only after being advised by your doctor or midwife. When sitting or lying down, keep your legs elevated to reduce swelling.   Avoid lifting anything heavier than your baby or a gallon of milk. Avoid driving for two weeks or while taking narcotics. No sexual intercourse for 6 weeks, or until advised by your OB provider. Nothing in the vagina: intercourse, tampons or douching. Be prepared to discuss family planning at your follow up OB visit. If your feel tired and have a lack of energy, you may continue to take your prenatal vitamins. Nap when your baby naps to catch on sleep. EMOTIONS    You may feel cantu, sad, teary and overwhelmed. Contact your OB provider if you think you may be showing signs of postpartum depression. Please refer to the Alvaro 1898 tab in your discharge binder. If your baby will not stop crying, contact another adult to help or place the baby in its crib on its back and take a break. Never shake your baby! CIPRIANO CARE     Vaginal bleeding will decrease in amount over the next few weeks. Cleanse your perineum from front to back using the cipriano-bottle after toileting until bleeding stops. You will notice that as your activity increases, your flow may also increase. This is a sign that you need to rest more often. Call your OB provider if you are saturating more than 1 maxi pad an hour and resting does not help. If used, stiches will dissolve in 4-6 weeks. To ease pain or swelling, use prescribed medications properly or use a sitz bath, if ordered. Kegel exercises will help to restore bladder control. BREAST CARE    FOR BREASTFEEDING MOMS:    If you become engorged, feeding may be more difficult or painful in 1 to 2 days. You may find it helpful to hand express some milk so that the infant can latch on more easily. While breastfeeding continue to take your prenatal vitamins as directed. Refer to the breastfeeding information in the discharge binder. FOR NON-BREASTFEEDING MOMS:    You may apply ice packs to your breasts over your bra for 20 minutes at a time for comfort.   Do not express breast

## 2023-03-31 NOTE — LACTATION NOTE
This note was copied from a baby's chart. Lactation Progress Note      Data:   F/U with primip 2PP with breastfeeding and lactation rounds on day of discharge. MOB reports that infant is doing well latching to both breast. Infant currently nursing in cradle hold to left side. MOB denies pain, does have some nipple soreness. Overall confident with breastfeeding and denies any questions or concerns. Feeding Method: Feeding Method Used: Breastfeeding 153/85 minutes. Lactation assisting primip breast feeder with uncomfortable latch  Urine output:  x 2 established   Stool output:  x 4 established  Percent weight change from birth:  -1%    Action: Introduced self to patient as LC for the day. Name and number placed on whiteboard. BF education reviewed with patient and what to expect over then next 1-2 weeks with mature milk production, infant feedings, infant output, breast care, engorgement prevention, pacifier, bottle use, and pumping information. Lanolin provided for nipple care with instruction to apply after each feeding to help with soreness as well as getting a deep latch. Discharge binder also reviewed with patient with f/u care and resources provided. All questions answered at this time. RN updated with education that was provided to patient. Patient instructed to call for f/u care as needed. Response: MOB is comfortable with breastfeeding at this time. Verbalizes understanding of bf education that was provided. Will f/u with outpatient services as needed.

## 2023-03-31 NOTE — FLOWSHEET NOTE
Discharge Phone Call    Patient Name: Khadijah Gomez     [facility] Care Provider: Tl Norton MD Discharge Date: 3/31/2023    Disposition of baby:    Phone Number: [phone] (home)     Attempts to Contact:  Date:    Caller  Date:    Caller  Date:    Caller    Information for the patient's :  Dwight Brittle [[id number]]   Delivery Method: Vaginal, Spontaneous     1. Now that you are at home is your pain being well controlled? Y/N   If no, instruct to call       provider. 2. Are you breastfeeding? Y/N    Do you need any extra support from our lactation staff? Y/N    If yes, provide number for lactation. 3. Have you made or already had your first appointment with the baby's doctor? Y/N   If no, do      you know when to schedule it? Y/N    4. Have you scheduled your follow-up appointment? Y/N  If no, do you know when to schedule       it? Y/N   If no, they can find it on printed discharge instructions. 5. Did staff discuss safe sleep during your stay? Y/N   6. Did we explain things in a way you could understand? Y/N  7. Were we respectful of your preferences for labor and birth and include you in the plan of       care? Y/N  If no, please explain _______________________________________________  8. Is there anyone in particular you would like to mention who provided care for you? _______      _________________________________________________________________________     9. Were you given a Post-Birth Warning Signs handout? Y/N  Do you have it somewhere      easily accessible? Y/N  If no, please send them a copy and ask them to put it somewhere      easily found. 10. Have you been crying excessively, having anger or mood swings that feel out of control, or       feel like you can't cope with caring for yourself or baby? Y/N   If yes, they may be showing       signs of postpartum depression and should call provider.  There is also a        depression test on page C5 in their discharge booklet they can take. 13. Do you have any other questions or concerns I can address today?  Y/N  ______________      _________________________________________________________________________    Information provided during call :_________________________________________________  ___________________________________________________________________________    Call completed by:____________________________    Date:_________ Time:___________

## 2023-03-31 NOTE — PLAN OF CARE
Problem: Pain  Goal: Verbalizes/displays adequate comfort level or baseline comfort level  3/29/2023 1958 by Bairon Garcia RN  Outcome: Progressing  3/29/2023 0720 by Alicja Back RN  Outcome: Progressing     Problem: Vaginal Birth or  Section  Goal: Fetal and maternal status remain reassuring during the birth process  Description:  Birth OB-Pregnancy care plan goal which identifies if the fetal and maternal status remain reassuring during the birth process  Outcome: Progressing     Problem: Infection - Adult  Goal: Absence of infection at discharge  Outcome: Progressing  Goal: Absence of infection during hospitalization  Outcome: Progressing  Goal: Absence of fever/infection during anticipated neutropenic period  Outcome: Progressing     Problem: Safety - Adult  Goal: Free from fall injury  Outcome: Progressing     Problem: Discharge Planning  Goal: Discharge to home or other facility with appropriate resources  Outcome: Progressing     Problem: Chronic Conditions and Co-morbidities  Goal: Patient's chronic conditions and co-morbidity symptoms are monitored and maintained or improved  Outcome: Progressing
Problem: Pain  Goal: Verbalizes/displays adequate comfort level or baseline comfort level  Outcome: Progressing     Problem: Vaginal Birth or  Section  Goal: Fetal and maternal status remain reassuring during the birth process  Description:  Birth OB-Pregnancy care plan goal which identifies if the fetal and maternal status remain reassuring during the birth process  Outcome: Progressing     Problem: Postpartum  Goal: Experiences normal postpartum course  Description:  Postpartum OB-Pregnancy care plan goal which identifies if the mother is experiencing a normal postpartum course  Outcome: Progressing  Goal: Appropriate maternal -  bonding  Description:  Postpartum OB-Pregnancy care plan goal which identifies if the mother and  are bonding appropriately  Outcome: Progressing  Goal: Establishment of infant feeding pattern  Description:  Postpartum OB-Pregnancy care plan goal which identifies if the mother is establishing a feeding pattern with their   Outcome: Progressing  Goal: Incisions, wounds, or drain sites healing without S/S of infection  Outcome: Progressing     Problem: Infection - Adult  Goal: Absence of infection at discharge  Outcome: Progressing  Goal: Absence of infection during hospitalization  Outcome: Progressing  Goal: Absence of fever/infection during anticipated neutropenic period  Outcome: Progressing     Problem: Safety - Adult  Goal: Free from fall injury  Outcome: Progressing     Problem: Discharge Planning  Goal: Discharge to home or other facility with appropriate resources  Outcome: Progressing     Problem: Chronic Conditions and Co-morbidities  Goal: Patient's chronic conditions and co-morbidity symptoms are monitored and maintained or improved  Outcome: Progressing
Problem: Chronic Conditions and Co-morbidities  Goal: Patient's chronic conditions and co-morbidity symptoms are monitored and maintained or improved  Outcome: Progressing

## 2023-03-31 NOTE — LACTATION NOTE
This note was copied from a baby's chart. Lactation Progress Note      Data:   Follow up with primip breastfeeder requesting assistance with feed. Says latch has been uncomfortable at times. Action: Introduced self, number on whiteboard. Infant laying across mother's body with belly facing away from her. Reviewed steps of achieving a deep latch including infant turned toward her body, nipple aimed at nose and arms on either side of breast. Encouraged breast support and bringing infant on quickly when mouth opens wide. KARIME achieved with SRS. Mother reports comfort with this latch. Also reviewed alignment of ear, shoulder and hips. Infant continued to feed for 10 minutes before leaving room. Response: Mother pleased with this latch and feeding attempt. Encouraged to call for follow up.

## 2023-03-31 NOTE — PROGRESS NOTES
Department of Obstetrics and Gynecology  Labor and Delivery  Attending Post Partum Progress Note      SUBJECTIVE:  Pt without complaints, pain controlled, tolerating po, lochia wnl, currently breast feeding. OBJECTIVE:      Vitals:  Vitals:    23 0829   BP: 110/66   Pulse: 90   Resp: 16   Temp: 97.9 °F (36.6 °C)   SpO2:        RRR CTAB  ABDOMEN:  soft, non-distended, non-tender, + BS  FF below umbilicus  EXT: no edema    DATA:    CBC:    Lab Results   Component Value Date/Time    WBC 24.5 2023 09:34 AM    HGB 11.1 2023 09:34 AM    HCT 33.0 2023 09:34 AM     2023 09:34 AM       ASSESSMENT & PLAN:    29 y.o.   OB History          1    Para   1    Term   1       0    AB   0    Living   1         SAB   0    IAB   0    Ectopic   0    Molar   0    Multiple   0    Live Births   1             s/p  ppd# 2  1. Doing well, continue routine post-partum care. 2.  Desires male infant circumcised. 3. Discharge today, f/u in 2 weeks for laceration f/u. 6 weeks postpartum visit.     Baljit Sarabia MD

## 2023-03-31 NOTE — PROCEDURES
Circumcision Note      Infant confirmed to be greater than 12 hours in age. Risks and benefits of circumcision explained to mother. All questions answered. Consent signed. Time out performed to verify infant and procedure. Infant prepped and draped in normal sterile fashion. 0.8 cc of  1% Lidocaine  used. Dorsal Block Anesthesia used. 1.1 cm Gomco clamp used to perform procedure. Foreskin removed and discarded. Estimated blood loss:  minimal.  Hemostatis noted. Sterile petroleum gauze applied to circumcised area. Infant tolerated the procedure well. Complications:  none.     Andres Roman MD

## 2023-03-31 NOTE — DISCHARGE SUMMARY
Obstetrical Discharge Form    Gestational Age:38w3d    Antepartum complications: LGA    Date of Delivery: 3/31/2023      Type of Delivery: vaginal, spontaneous    Delivered By:  Dr. Yfn Short:   Information for the patient's :  Liliam Cord [[id number]]      Anesthesia: Epidural    Intrapartum complications: None    Postpartum complications: 4th degree laceration    Discharge Medication:      Medication List        START taking these medications      docusate 100 MG Caps  Commonly known as: COLACE, DULCOLAX  Take 100 mg by mouth 2 times daily     ibuprofen 600 MG tablet  Commonly known as: ADVIL;MOTRIN  Take 1 tablet by mouth every 6 hours as needed for Pain            CONTINUE taking these medications      PRENATAL 1+1 PO               Where to Get Your Medications        These medications were sent to [id number], [facility] - [address] Donnice Frankel [phone]  [address] [facility] [address]      Phone: [phone]   docusate 100 MG Caps  ibuprofen 600 MG tablet          Discharge Date:3/31/2023    Condition on discharge: Stable    Plan:   Follow up 2 weeks and 6 weeks  Reviewed discharge instructions and scripts     Ovidio Alvarez MD

## 2023-11-03 NOTE — ANESTHESIA POSTPROCEDURE EVALUATION
Department of Anesthesiology  Postprocedure Note    Patient: Faith Arevalo  MRN: [id number]  YOB: 1990  Date of evaluation: 3/30/2023      Procedure Summary     Date: 03/28/23 Room / Location:     Anesthesia Start: 1330 Anesthesia Stop: 03/29/23 2046    Procedure: Labor Analgesia Diagnosis:     Scheduled Providers:  Responsible Provider: Lacey Gregorio DO    Anesthesia Type: epidural ASA Status: 2 - Emergent          Anesthesia Type: No value filed. Christopher Phase I: Christopher Score: 9    Christopher Phase II: Christopher Score: 10      Anesthesia Post Evaluation    Level of consciousness: awake  Complications: no  Cardiovascular status: hemodynamically stable  Respiratory status: acceptable  Comments: No apparent complications from neuraxial anesthesia.
yes

## 2024-06-13 LAB
C. TRACHOMATIS, EXTERNAL RESULT: NEGATIVE
N. GONORRHOEAE, EXTERNAL RESULT: NEGATIVE

## 2024-06-24 LAB — HEPATITIS C ANTIBODY, EXTERNAL RESULT: NONREACTIVE

## 2024-06-27 LAB
ABO, EXTERNAL RESULT: NORMAL
HEP B, EXTERNAL RESULT: NEGATIVE
HIV, EXTERNAL RESULT: NORMAL
RH FACTOR, EXTERNAL RESULT: POSITIVE
RPR, EXTERNAL RESULT: NEGATIVE
RUBELLA TITER, EXTERNAL RESULT: NORMAL

## 2025-01-09 LAB — GBS, EXTERNAL RESULT: NEGATIVE

## 2025-01-14 ENCOUNTER — HOSPITAL ENCOUNTER (OUTPATIENT)
Dept: LABOR AND DELIVERY | Age: 35
Discharge: HOME | End: 2025-01-14

## 2025-01-14 ENCOUNTER — HOSPITAL ENCOUNTER (OUTPATIENT)
Age: 35
Discharge: HOME | End: 2025-01-14
Attending: OBSTETRICS & GYNECOLOGY | Admitting: OBSTETRICS & GYNECOLOGY
Payer: COMMERCIAL

## 2025-01-14 VITALS
TEMPERATURE: 97.9 F | WEIGHT: 220 LBS | HEIGHT: 68 IN | SYSTOLIC BLOOD PRESSURE: 127 MMHG | BODY MASS INDEX: 33.34 KG/M2 | DIASTOLIC BLOOD PRESSURE: 76 MMHG | RESPIRATION RATE: 16 BRPM | HEART RATE: 82 BPM

## 2025-01-14 PROCEDURE — 6360000002 HC RX W HCPCS

## 2025-01-14 PROCEDURE — 96372 THER/PROPH/DIAG INJ SC/IM: CPT

## 2025-01-14 PROCEDURE — 59412 ANTEPARTUM MANIPULATION: CPT

## 2025-01-14 RX ORDER — TERBUTALINE SULFATE 1 MG/ML
0.25 INJECTION, SOLUTION SUBCUTANEOUS ONCE
Status: COMPLETED | OUTPATIENT
Start: 2025-01-14 | End: 2025-01-14

## 2025-01-14 RX ORDER — TERBUTALINE SULFATE 1 MG/ML
INJECTION, SOLUTION SUBCUTANEOUS
Status: COMPLETED
Start: 2025-01-14 | End: 2025-01-14

## 2025-01-14 RX ADMIN — TERBUTALINE SULFATE 0.25 MG: 1 INJECTION, SOLUTION SUBCUTANEOUS at 10:23

## 2025-01-14 NOTE — PROCEDURES
External Cephalic Version Procedure Note     Date: 25  Surgeon: NATALIO TOMLINSON MD    Assistant:      Pre Op Diagnosis: breech presentation    Post Op Diagnosis: same     Procedure: External Cephalic Version under US guidance. Unsuccessful     Findings:  Reassuring fetal status before and after procedure as noted by reactive NST.     Indications:   Pt is a 34 y.o.  at 37w2d (Estimated Date of Delivery: 25) who presents to [facility] for scheduled External Cephalic Version. Reactive NST noted. Breech presentation was confirmed on bedside US in triage. Reviewed options again with patient including 1) attempted ECV today, 2) continued observation until due date, 3) Scheduled PLTCS 39 - 40wks. RBA for each were reviewed in detail. Reviewed possibility of failed procedure today, need for emergent CS due to fetal heart changes / fetal intolerance / abruption, etc... at time of procedure, reviewed chance of PROM or injury to fetus, chance of reversion to breech before 39 wks requiring either an additional ECV attempt or PLTCS. Pt / FOB verbalized understanding. Elects to attempt procedure today. Pt verbalized understanding, Consents were signed. Otherwise pregnancy has been uncomplicated.    Description of Procedure:   Bedside US was performed, confirming single intrauterine pregnancy with sharif breech presentation. MICHAEL subjectively normal. The fetal pelvis was noted in the maternal left pelvis. Spine along maternal midline and head in the maternal right. Using manual pressure, the fetal sacrum was disengaged from lower uterine segment. Fetal head was then gently pushed toward the right, attempting to induce a forward roll. This was not successful so an attempt was made to induce a backward roll but this was not successful either. Patient tolerated the procedure well.     Following the procedure, she was noted to have reassuring and reactive fetal heart tracing for one hour post procedure. She did not

## 2025-01-14 NOTE — PROGRESS NOTES
Dr. Landers at bedside for version procedure. Pt tolerated well. Version unsuccessful. Pt placed back on EFM.

## 2025-01-14 NOTE — H&P
[facility]  Labor and Delivery Triage Note        CHIEF COMPLAINT:  breech presentation    HISTORY OF PRESENT ILLNESS:      The patient is a 34 y.o. female 37w2d.    OB History          2    Para   1    Term   1       0    AB   0    Living   1         SAB   0    IAB   0    Ectopic   0    Molar   0    Multiple   0    Live Births   1              Patient presents for ECV.     Estimated Due Date:  Estimated Date of Delivery: 25    PAST MEDICAL HISTORY: History reviewed. No pertinent past medical history.    PAST  SURGICAL HISTORY:   Past Surgical History:   Procedure Laterality Date    ARM SURGERY      KNEE SURGERY         SOCIAL HISTORY:     reports that she has never smoked. She has never been exposed to tobacco smoke. She has never used smokeless tobacco. She reports that she does not drink alcohol and does not use drugs.     MEDICATIONS:    Prior to Admission medications    Medication Sig Start Date End Date Taking? Authorizing Provider   Prenatal Vit-Fe Fumarate-FA (PRENATAL 1+1 PO) Take by mouth   Yes Provider, MD Angle   ibuprofen (ADVIL;MOTRIN) 600 MG tablet Take 1 tablet by mouth every 6 hours as needed for Pain  Patient not taking: Reported on 2025 3/31/23   Nereyda Burhnam MD   docusate sodium (COLACE, DULCOLAX) 100 MG CAPS Take 100 mg by mouth 2 times daily  Patient not taking: Reported on 2025 3/31/23   Nereyda Burnham MD        PRENATAL CARE:    Complicated by: none    REVIEW OF SYSTEMS:     negative    PHYSICAL EXAM:    Vital Signs: Blood pressure 127/76, pulse 82, temperature 97.9 °F (36.6 °C), temperature source Oral, resp. rate 16, height 1.727 m (5' 8\"), weight 99.8 kg (220 lb), unknown if currently breastfeeding.    normal    Fetal heart rate:  Baseline Heart Rate 130, accelerations:  absent  Cervix:  not checked    Contraction frequency:  irregular    Membranes:  Intact    General Labs:          ASSESSMENT:    37w2d.  Patient Active Problem List   Diagnosis

## 2025-01-19 ENCOUNTER — HOSPITAL ENCOUNTER (INPATIENT)
Age: 35
LOS: 2 days | Discharge: HOME | End: 2025-01-22
Attending: OBSTETRICS & GYNECOLOGY | Admitting: OBSTETRICS & GYNECOLOGY
Payer: COMMERCIAL

## 2025-01-19 LAB
AMPHETAMINES UR QL SCN>1000 NG/ML: NORMAL
BARBITURATES UR QL SCN>200 NG/ML: NORMAL
BASOPHILS # BLD: 0 K/UL (ref 0–0.2)
BASOPHILS NFR BLD: 0.2 %
BENZODIAZ UR QL SCN>200 NG/ML: NORMAL
BUPRENORPHINE+NOR UR QL SCN: NORMAL
CANNABINOIDS UR QL SCN>50 NG/ML: NORMAL
COCAINE UR QL SCN: NORMAL
DEPRECATED RDW RBC AUTO: 13.2 % (ref 12.4–15.4)
DRUG SCREEN COMMENT UR-IMP: NORMAL
EOSINOPHIL # BLD: 0.1 K/UL (ref 0–0.6)
EOSINOPHIL NFR BLD: 0.8 %
FENTANYL SCREEN, URINE: NORMAL
HCT VFR BLD AUTO: 37.6 % (ref 36–48)
HGB BLD-MCNC: 13.1 G/DL (ref 12–16)
LYMPHOCYTES # BLD: 2.8 K/UL (ref 1–5.1)
LYMPHOCYTES NFR BLD: 17.5 %
MCH RBC QN AUTO: 31.2 PG (ref 26–34)
MCHC RBC AUTO-ENTMCNC: 34.8 G/DL (ref 31–36)
MCV RBC AUTO: 89.6 FL (ref 80–100)
METHADONE UR QL SCN>300 NG/ML: NORMAL
MONOCYTES # BLD: 1 K/UL (ref 0–1.3)
MONOCYTES NFR BLD: 6.2 %
NEUTROPHILS # BLD: 12.1 K/UL (ref 1.7–7.7)
NEUTROPHILS NFR BLD: 75.3 %
OPIATES UR QL SCN>300 NG/ML: NORMAL
OXYCODONE UR QL SCN: NORMAL
PCP UR QL SCN>25 NG/ML: NORMAL
PH UR STRIP: 6 [PH]
PLATELET # BLD AUTO: 279 K/UL (ref 135–450)
PMV BLD AUTO: 7.4 FL (ref 5–10.5)
RBC # BLD AUTO: 4.2 M/UL (ref 4–5.2)
WBC # BLD AUTO: 16 K/UL (ref 4–11)

## 2025-01-19 PROCEDURE — 86780 TREPONEMA PALLIDUM: CPT

## 2025-01-19 PROCEDURE — 2580000003 HC RX 258: Performed by: OBSTETRICS & GYNECOLOGY

## 2025-01-19 PROCEDURE — 86901 BLOOD TYPING SEROLOGIC RH(D): CPT

## 2025-01-19 PROCEDURE — 80307 DRUG TEST PRSMV CHEM ANLYZR: CPT

## 2025-01-19 PROCEDURE — 86850 RBC ANTIBODY SCREEN: CPT

## 2025-01-19 PROCEDURE — 86900 BLOOD TYPING SEROLOGIC ABO: CPT

## 2025-01-19 PROCEDURE — 85025 COMPLETE CBC W/AUTO DIFF WBC: CPT

## 2025-01-19 RX ORDER — SODIUM CHLORIDE, SODIUM LACTATE, POTASSIUM CHLORIDE, CALCIUM CHLORIDE 600; 310; 30; 20 MG/100ML; MG/100ML; MG/100ML; MG/100ML
INJECTION, SOLUTION INTRAVENOUS CONTINUOUS
Status: DISCONTINUED | OUTPATIENT
Start: 2025-01-20 | End: 2025-01-20

## 2025-01-19 RX ORDER — SODIUM CHLORIDE, SODIUM LACTATE, POTASSIUM CHLORIDE, AND CALCIUM CHLORIDE .6; .31; .03; .02 G/100ML; G/100ML; G/100ML; G/100ML
500 INJECTION, SOLUTION INTRAVENOUS ONCE
Status: COMPLETED | OUTPATIENT
Start: 2025-01-20 | End: 2025-01-20

## 2025-01-19 RX ADMIN — SODIUM CHLORIDE, POTASSIUM CHLORIDE, SODIUM LACTATE AND CALCIUM CHLORIDE 500 ML: 600; 310; 30; 20 INJECTION, SOLUTION INTRAVENOUS at 23:32

## 2025-01-20 ENCOUNTER — ANESTHESIA (OUTPATIENT)
Dept: LABOR AND DELIVERY | Age: 35
End: 2025-01-20
Payer: COMMERCIAL

## 2025-01-20 ENCOUNTER — ANESTHESIA EVENT (OUTPATIENT)
Dept: LABOR AND DELIVERY | Age: 35
End: 2025-01-20
Payer: COMMERCIAL

## 2025-01-20 LAB
ABO + RH BLD: NORMAL
BLD GP AB SCN SERPL QL: NORMAL
REAGIN+T PALLIDUM IGG+IGM SERPL-IMP: NORMAL

## 2025-01-20 PROCEDURE — 6360000002 HC RX W HCPCS: Performed by: OBSTETRICS & GYNECOLOGY

## 2025-01-20 PROCEDURE — 3700000001 HC ADD 15 MINUTES (ANESTHESIA): Performed by: OBSTETRICS & GYNECOLOGY

## 2025-01-20 PROCEDURE — 7100000001 HC PACU RECOVERY - ADDTL 15 MIN: Performed by: OBSTETRICS & GYNECOLOGY

## 2025-01-20 PROCEDURE — 2709999900 HC NON-CHARGEABLE SUPPLY: Performed by: OBSTETRICS & GYNECOLOGY

## 2025-01-20 PROCEDURE — 2580000003 HC RX 258: Performed by: OBSTETRICS & GYNECOLOGY

## 2025-01-20 PROCEDURE — 7100000000 HC PACU RECOVERY - FIRST 15 MIN: Performed by: OBSTETRICS & GYNECOLOGY

## 2025-01-20 PROCEDURE — 2500000003 HC RX 250 WO HCPCS: Performed by: NURSE ANESTHETIST, CERTIFIED REGISTERED

## 2025-01-20 PROCEDURE — 6360000002 HC RX W HCPCS: Performed by: NURSE ANESTHETIST, CERTIFIED REGISTERED

## 2025-01-20 PROCEDURE — 6360000002 HC RX W HCPCS

## 2025-01-20 PROCEDURE — 6370000000 HC RX 637 (ALT 250 FOR IP): Performed by: OBSTETRICS & GYNECOLOGY

## 2025-01-20 PROCEDURE — 3609079900 HC CESAREAN SECTION: Performed by: OBSTETRICS & GYNECOLOGY

## 2025-01-20 PROCEDURE — 2500000003 HC RX 250 WO HCPCS: Performed by: OBSTETRICS & GYNECOLOGY

## 2025-01-20 PROCEDURE — 3700000000 HC ANESTHESIA ATTENDED CARE: Performed by: OBSTETRICS & GYNECOLOGY

## 2025-01-20 PROCEDURE — 1220000000 HC SEMI PRIVATE OB R&B

## 2025-01-20 RX ORDER — SODIUM CHLORIDE 0.9 % (FLUSH) 0.9 %
5-40 SYRINGE (ML) INJECTION EVERY 12 HOURS SCHEDULED
Status: DISCONTINUED | OUTPATIENT
Start: 2025-01-20 | End: 2025-01-22 | Stop reason: HOSPADM

## 2025-01-20 RX ORDER — FAMOTIDINE 10 MG/ML
INJECTION, SOLUTION INTRAVENOUS
Status: DISCONTINUED | OUTPATIENT
Start: 2025-01-20 | End: 2025-01-20 | Stop reason: SDUPTHER

## 2025-01-20 RX ORDER — ACETAMINOPHEN 325 MG/1
975 TABLET ORAL ONCE
Status: DISCONTINUED | OUTPATIENT
Start: 2025-01-20 | End: 2025-01-20

## 2025-01-20 RX ORDER — SODIUM CHLORIDE, SODIUM LACTATE, POTASSIUM CHLORIDE, AND CALCIUM CHLORIDE .6; .31; .03; .02 G/100ML; G/100ML; G/100ML; G/100ML
1000 INJECTION, SOLUTION INTRAVENOUS ONCE
Status: DISCONTINUED | OUTPATIENT
Start: 2025-01-20 | End: 2025-01-20

## 2025-01-20 RX ORDER — SODIUM CHLORIDE 0.9 % (FLUSH) 0.9 %
5-40 SYRINGE (ML) INJECTION PRN
Status: DISCONTINUED | OUTPATIENT
Start: 2025-01-20 | End: 2025-01-22 | Stop reason: HOSPADM

## 2025-01-20 RX ORDER — OXYCODONE HYDROCHLORIDE 5 MG/1
10 TABLET ORAL EVERY 4 HOURS PRN
Status: DISCONTINUED | OUTPATIENT
Start: 2025-01-20 | End: 2025-01-22 | Stop reason: HOSPADM

## 2025-01-20 RX ORDER — SODIUM CHLORIDE, SODIUM LACTATE, POTASSIUM CHLORIDE, CALCIUM CHLORIDE 600; 310; 30; 20 MG/100ML; MG/100ML; MG/100ML; MG/100ML
INJECTION, SOLUTION INTRAVENOUS CONTINUOUS
Status: DISCONTINUED | OUTPATIENT
Start: 2025-01-20 | End: 2025-01-22 | Stop reason: HOSPADM

## 2025-01-20 RX ORDER — MIDAZOLAM HYDROCHLORIDE 5 MG/ML
INJECTION, SOLUTION INTRAMUSCULAR; INTRAVENOUS
Status: DISCONTINUED | OUTPATIENT
Start: 2025-01-20 | End: 2025-01-20 | Stop reason: SDUPTHER

## 2025-01-20 RX ORDER — SODIUM CHLORIDE 9 MG/ML
INJECTION, SOLUTION INTRAVENOUS PRN
Status: DISCONTINUED | OUTPATIENT
Start: 2025-01-20 | End: 2025-01-22 | Stop reason: HOSPADM

## 2025-01-20 RX ORDER — IBUPROFEN 800 MG/1
800 TABLET, FILM COATED ORAL EVERY 8 HOURS
Status: DISCONTINUED | OUTPATIENT
Start: 2025-01-21 | End: 2025-01-22 | Stop reason: HOSPADM

## 2025-01-20 RX ORDER — IBUPROFEN 800 MG/1
TABLET, FILM COATED ORAL
Status: DISPENSED
Start: 2025-01-20 | End: 2025-01-21

## 2025-01-20 RX ORDER — FERROUS SULFATE 325(65) MG
325 TABLET ORAL 2 TIMES DAILY WITH MEALS
Status: DISCONTINUED | OUTPATIENT
Start: 2025-01-20 | End: 2025-01-22 | Stop reason: HOSPADM

## 2025-01-20 RX ORDER — BUPIVACAINE HYDROCHLORIDE 7.5 MG/ML
INJECTION, SOLUTION INTRASPINAL
Status: DISCONTINUED | OUTPATIENT
Start: 2025-01-20 | End: 2025-01-20 | Stop reason: SDUPTHER

## 2025-01-20 RX ORDER — FAMOTIDINE 10 MG/ML
INJECTION, SOLUTION INTRAVENOUS
Status: COMPLETED
Start: 2025-01-20 | End: 2025-01-20

## 2025-01-20 RX ORDER — NALBUPHINE HYDROCHLORIDE 10 MG/ML
10 INJECTION INTRAMUSCULAR; INTRAVENOUS; SUBCUTANEOUS
Status: DISCONTINUED | OUTPATIENT
Start: 2025-01-20 | End: 2025-01-20

## 2025-01-20 RX ORDER — MORPHINE SULFATE 10 MG/ML
INJECTION, SOLUTION INTRAMUSCULAR; INTRAVENOUS
Status: DISCONTINUED | OUTPATIENT
Start: 2025-01-20 | End: 2025-01-20 | Stop reason: SDUPTHER

## 2025-01-20 RX ORDER — DIPHENHYDRAMINE HYDROCHLORIDE 50 MG/ML
25 INJECTION INTRAMUSCULAR; INTRAVENOUS EVERY 6 HOURS PRN
Status: DISCONTINUED | OUTPATIENT
Start: 2025-01-20 | End: 2025-01-22 | Stop reason: HOSPADM

## 2025-01-20 RX ORDER — KETOROLAC TROMETHAMINE 30 MG/ML
30 INJECTION, SOLUTION INTRAMUSCULAR; INTRAVENOUS EVERY 6 HOURS
Status: DISPENSED | OUTPATIENT
Start: 2025-01-20 | End: 2025-01-21

## 2025-01-20 RX ORDER — AZITHROMYCIN MONOHYDRATE 500 MG/5ML
INJECTION, POWDER, LYOPHILIZED, FOR SOLUTION INTRAVENOUS
Status: DISCONTINUED
Start: 2025-01-20 | End: 2025-01-20

## 2025-01-20 RX ORDER — ACETAMINOPHEN 500 MG
1000 TABLET ORAL EVERY 8 HOURS SCHEDULED
Status: DISCONTINUED | OUTPATIENT
Start: 2025-01-20 | End: 2025-01-22 | Stop reason: HOSPADM

## 2025-01-20 RX ORDER — SODIUM CHLORIDE 0.9 % (FLUSH) 0.9 %
10 SYRINGE (ML) INJECTION PRN
Status: DISCONTINUED | OUTPATIENT
Start: 2025-01-20 | End: 2025-01-20

## 2025-01-20 RX ORDER — ONDANSETRON 2 MG/ML
INJECTION INTRAMUSCULAR; INTRAVENOUS
Status: DISCONTINUED | OUTPATIENT
Start: 2025-01-20 | End: 2025-01-20 | Stop reason: SDUPTHER

## 2025-01-20 RX ORDER — DOCUSATE SODIUM 100 MG/1
100 CAPSULE, LIQUID FILLED ORAL 2 TIMES DAILY PRN
Status: DISCONTINUED | OUTPATIENT
Start: 2025-01-20 | End: 2025-01-22 | Stop reason: HOSPADM

## 2025-01-20 RX ORDER — FENTANYL CITRATE 50 UG/ML
INJECTION, SOLUTION INTRAMUSCULAR; INTRAVENOUS
Status: DISCONTINUED | OUTPATIENT
Start: 2025-01-20 | End: 2025-01-20 | Stop reason: SDUPTHER

## 2025-01-20 RX ORDER — MORPHINE SULFATE 0.5 MG/ML
INJECTION, SOLUTION EPIDURAL; INTRATHECAL; INTRAVENOUS
Status: DISCONTINUED | OUTPATIENT
Start: 2025-01-20 | End: 2025-01-20 | Stop reason: SDUPTHER

## 2025-01-20 RX ORDER — OXYCODONE HYDROCHLORIDE 5 MG/1
5 TABLET ORAL EVERY 4 HOURS PRN
Status: DISCONTINUED | OUTPATIENT
Start: 2025-01-20 | End: 2025-01-22 | Stop reason: HOSPADM

## 2025-01-20 RX ORDER — SIMETHICONE 80 MG
80 TABLET,CHEWABLE ORAL EVERY 6 HOURS PRN
Status: DISCONTINUED | OUTPATIENT
Start: 2025-01-20 | End: 2025-01-22 | Stop reason: HOSPADM

## 2025-01-20 RX ORDER — SODIUM CHLORIDE 0.9 % (FLUSH) 0.9 %
5-40 SYRINGE (ML) INJECTION EVERY 12 HOURS SCHEDULED
Status: DISCONTINUED | OUTPATIENT
Start: 2025-01-20 | End: 2025-01-20

## 2025-01-20 RX ORDER — ONDANSETRON 2 MG/ML
4 INJECTION INTRAMUSCULAR; INTRAVENOUS EVERY 6 HOURS PRN
Status: DISCONTINUED | OUTPATIENT
Start: 2025-01-20 | End: 2025-01-20

## 2025-01-20 RX ORDER — SODIUM CHLORIDE, SODIUM LACTATE, POTASSIUM CHLORIDE, CALCIUM CHLORIDE 600; 310; 30; 20 MG/100ML; MG/100ML; MG/100ML; MG/100ML
INJECTION, SOLUTION INTRAVENOUS CONTINUOUS
Status: DISCONTINUED | OUTPATIENT
Start: 2025-01-20 | End: 2025-01-20

## 2025-01-20 RX ORDER — SODIUM CHLORIDE 9 MG/ML
INJECTION, SOLUTION INTRAVENOUS PRN
Status: DISCONTINUED | OUTPATIENT
Start: 2025-01-20 | End: 2025-01-20

## 2025-01-20 RX ORDER — TERBUTALINE SULFATE 1 MG/ML
INJECTION, SOLUTION SUBCUTANEOUS
Status: COMPLETED
Start: 2025-01-20 | End: 2025-01-20

## 2025-01-20 RX ORDER — PRENATAL WITH FERROUS FUM AND FOLIC ACID 3080; 920; 120; 400; 22; 1.84; 3; 20; 10; 1; 12; 200; 27; 25; 2 [IU]/1; [IU]/1; MG/1; [IU]/1; MG/1; MG/1; MG/1; MG/1; MG/1; MG/1; UG/1; MG/1; MG/1; MG/1; MG/1
1 TABLET ORAL DAILY
Status: DISCONTINUED | OUTPATIENT
Start: 2025-01-20 | End: 2025-01-22 | Stop reason: HOSPADM

## 2025-01-20 RX ORDER — TERBUTALINE SULFATE 1 MG/ML
0.25 INJECTION, SOLUTION SUBCUTANEOUS ONCE
Status: COMPLETED | OUTPATIENT
Start: 2025-01-20 | End: 2025-01-20

## 2025-01-20 RX ORDER — OXYTOCIN 10 [USP'U]/ML
INJECTION, SOLUTION INTRAMUSCULAR; INTRAVENOUS
Status: DISCONTINUED | OUTPATIENT
Start: 2025-01-20 | End: 2025-01-20 | Stop reason: SDUPTHER

## 2025-01-20 RX ADMIN — Medication 87.3 MILLI-UNITS/MIN: at 09:41

## 2025-01-20 RX ADMIN — MORPHINE SULFATE 2 MG: 10 INJECTION, SOLUTION INTRAMUSCULAR; INTRAVENOUS at 08:39

## 2025-01-20 RX ADMIN — MIDAZOLAM HYDROCHLORIDE 1 MG: 5 INJECTION, SOLUTION INTRAMUSCULAR; INTRAVENOUS at 08:54

## 2025-01-20 RX ADMIN — KETOROLAC TROMETHAMINE 30 MG: 30 INJECTION, SOLUTION INTRAMUSCULAR at 16:16

## 2025-01-20 RX ADMIN — TERBUTALINE SULFATE 0.25 MG: 1 INJECTION, SOLUTION SUBCUTANEOUS at 06:29

## 2025-01-20 RX ADMIN — MIDAZOLAM HYDROCHLORIDE 2 MG: 5 INJECTION, SOLUTION INTRAMUSCULAR; INTRAVENOUS at 08:45

## 2025-01-20 RX ADMIN — Medication 30 MG: at 08:56

## 2025-01-20 RX ADMIN — Medication 10 ML: at 20:18

## 2025-01-20 RX ADMIN — ONDANSETRON 4 MG: 2 INJECTION INTRAMUSCULAR; INTRAVENOUS at 07:50

## 2025-01-20 RX ADMIN — AZITHROMYCIN MONOHYDRATE 500 MG: 500 INJECTION, POWDER, LYOPHILIZED, FOR SOLUTION INTRAVENOUS at 08:13

## 2025-01-20 RX ADMIN — Medication 10 ML: at 10:14

## 2025-01-20 RX ADMIN — MORPHINE SULFATE 1 MG: 10 INJECTION, SOLUTION INTRAMUSCULAR; INTRAVENOUS at 08:47

## 2025-01-20 RX ADMIN — FAMOTIDINE 20 MG: 10 INJECTION, SOLUTION INTRAVENOUS at 07:50

## 2025-01-20 RX ADMIN — CEFAZOLIN 2000 MG: 2 INJECTION, POWDER, FOR SOLUTION INTRAVENOUS at 08:06

## 2025-01-20 RX ADMIN — Medication 40 MG: at 08:59

## 2025-01-20 RX ADMIN — DOCUSATE SODIUM 100 MG: 100 CAPSULE, LIQUID FILLED ORAL at 20:17

## 2025-01-20 RX ADMIN — SODIUM CHLORIDE, SODIUM LACTATE, POTASSIUM CHLORIDE, AND CALCIUM CHLORIDE: .6; .31; .03; .02 INJECTION, SOLUTION INTRAVENOUS at 07:35

## 2025-01-20 RX ADMIN — KETOROLAC TROMETHAMINE 30 MG: 30 INJECTION, SOLUTION INTRAMUSCULAR at 10:13

## 2025-01-20 RX ADMIN — FENTANYL CITRATE 100 MCG: 50 INJECTION, SOLUTION INTRAMUSCULAR; INTRAVENOUS at 08:39

## 2025-01-20 RX ADMIN — BUPIVACAINE HYDROCHLORIDE 1.8 ML: 7.5 INJECTION, SOLUTION SUBARACHNOID at 08:10

## 2025-01-20 RX ADMIN — ACETAMINOPHEN 1000 MG: 500 TABLET ORAL at 13:50

## 2025-01-20 RX ADMIN — OXYTOCIN 20 UNITS: 10 INJECTION, SOLUTION INTRAMUSCULAR; INTRAVENOUS at 08:41

## 2025-01-20 RX ADMIN — HYDROMORPHONE HYDROCHLORIDE 1 MG: 1 INJECTION, SOLUTION INTRAMUSCULAR; INTRAVENOUS; SUBCUTANEOUS at 10:45

## 2025-01-20 RX ADMIN — MIDAZOLAM HYDROCHLORIDE 2 MG: 5 INJECTION, SOLUTION INTRAMUSCULAR; INTRAVENOUS at 08:39

## 2025-01-20 RX ADMIN — SODIUM CHLORIDE, SODIUM LACTATE, POTASSIUM CHLORIDE, AND CALCIUM CHLORIDE: .6; .31; .03; .02 INJECTION, SOLUTION INTRAVENOUS at 08:39

## 2025-01-20 RX ADMIN — ACETAMINOPHEN 1000 MG: 500 TABLET ORAL at 20:17

## 2025-01-20 RX ADMIN — NALBUPHINE HYDROCHLORIDE 10 MG: 10 INJECTION, SOLUTION INTRAMUSCULAR; INTRAVENOUS; SUBCUTANEOUS at 03:05

## 2025-01-20 RX ADMIN — Medication 20 MG: at 08:46

## 2025-01-20 RX ADMIN — MORPHINE SULFATE 0.15 MG: 0.5 INJECTION EPIDURAL; INTRATHECAL; INTRAVENOUS at 08:10

## 2025-01-20 ASSESSMENT — PAIN DESCRIPTION - LOCATION
LOCATION: INCISION
LOCATION: ABDOMEN
LOCATION: INCISION

## 2025-01-20 ASSESSMENT — PAIN DESCRIPTION - ORIENTATION
ORIENTATION: RIGHT
ORIENTATION: RIGHT

## 2025-01-20 ASSESSMENT — PAIN SCALES - GENERAL
PAINLEVEL_OUTOF10: 2
PAINLEVEL_OUTOF10: 7
PAINLEVEL_OUTOF10: 0
PAINLEVEL_OUTOF10: 2
PAINLEVEL_OUTOF10: 0

## 2025-01-20 ASSESSMENT — PAIN DESCRIPTION - DESCRIPTORS
DESCRIPTORS: SORE
DESCRIPTORS: SORE

## 2025-01-20 ASSESSMENT — PAIN - FUNCTIONAL ASSESSMENT
PAIN_FUNCTIONAL_ASSESSMENT: ACTIVITIES ARE NOT PREVENTED

## 2025-01-20 NOTE — PROGRESS NOTES
RN called to bedside by pt.   Pt asking to clarify the plan.   Discussed rechecking SVE closer to 7am unless needed sooner.   Pt verbalized understanding, requesting 2nd dose of nubain.   Unable to have another dose until 0605.

## 2025-01-20 NOTE — ANESTHESIA PROCEDURE NOTES
Spinal Block    Patient location during procedure: OB  End time: 1/20/2025 8:10 AM  Reason for block: primary anesthetic  Staffing  Performed: resident/CRNA   Anesthesiologist: Mahesh Dillard MD  Resident/CRNA: Ronald Soler APRN - CRNA  Performed by: Ronald Soler APRN - CRNA  Authorized by: Mahesh Dillard MD    Spinal Block  Patient position: sitting  Prep: Betadine  Patient monitoring: cardiac monitor, frequent blood pressure checks and continuous pulse ox  Approach: midline  Location: L3/L4  Provider prep: mask and sterile gloves  Local infiltration: lidocaine  Needle  Needle type: Pencan   Needle gauge: 25 G  Needle length: 3.5 in  Assessment  Sensory level: T4  Swirl obtained: Yes  CSF: clear  Attempts: 2  Hemodynamics: stable  Additional Notes  Sitting position betadine x3 Sterile prep and drape. Skin wheal with 1% xylocaine. SAB with 20 ga. Introducer. Clear CSF aspirated. Medication injected. Pt assisted supine. KIMMIE  Preanesthetic Checklist  Completed: patient identified, IV checked, risks and benefits discussed, equipment checked, pre-op evaluation, anesthesia consent given, oxygen available and monitors applied/VS acknowledged

## 2025-01-20 NOTE — BRIEF OP NOTE
Brief Postoperative Note      Patient: Kenna Nolen  YOB: 1990  MRN: [id number]    Date of Procedure: 2025    Pre-op Diagnosis: breech presentation, active labor    Post-Op Diagnosis: Same       Procedure(s):   SECTION    Surgeon(s):  Evelia Avitia MD    Assistant:  * No surgical staff found *    Anesthesia: Spinal    Estimated Blood Loss (mL): 974    Complications: None    Specimens:   * No specimens in log *    Implants:  * No implants in log *      Drains:   Urinary Catheter 25 Valderrama (Active)       Findings:  Infection Present At Time Of Surgery (PATOS) (choose all levels that have infection present):  No infection present  Other Findings: VMI, Apgars 4 and 8 at one and five minutes respectively  Wt: 3030 grams= 6 lbs 11 oz, sharif breech, placenta intact, 3 VC    Electronically signed by Evelia Avitia MD on 2025 at 9:22 AM

## 2025-01-20 NOTE — OP NOTE
up, and the rectus muscles were dissected off with Haji scissors and bluntly.  The inferior aspect of the fascia was grasped with Kocher clamps, tented up, and the rectus muscles were dissected off with Haji scissors and bluntly.  The rectus muscles were , and the peritoneum was entered bluntly.  The incision was extended superiorly and inferiorly with traction.  The bladder blade was placed.  The vesicouterine peritoneum was grasped with pickups, entered sharply with Metzenbaum scissors, extended laterally with Metzenbaum scissors, and the bladder flap was made digitally.  The bladder blade was replaced.  Uterine incision was made with a scalpel.  Clear fluid was noted.  The uterine incision was extended with superior and inferior traction.  Initially, the infant was attempted to deliver sharif breech, however, with little movement, the right foot was grasped and the right leg was delivered.  Then, the infant was turned over to the back, the left foot was grasped, and the leg was swept out.  The hips were grabbed with moist blue towel.  The infant's arms were delivered and then the head atraumatically.  The cord was doubly clamped and cut.  The infant was handed to the awaiting nurse.  Placenta was delivered intact with gentle traction.  The bladder blade was replaced.  The uterus was exteriorized and cleared of all clots and debris.  The uterine incision was closed with 0 Vicryl in a running locked fashion.  A second suture of the same was used to imbricate the first.  [id number] of 0 Vicryl were placed until hemostasis was noted.  The uterus was returned to the abdomen.  The gutters were irrigated and cleared of all clots and debris.  The uterine incision was revisualized in situ and noted to be hemostatic.  The bladder blade was removed, and the peritoneum was reapproximated with 2-0 Vicryl.  The fascia was reapproximated with 0 Vicryl in a running fashion.  The subcutaneous tissue was

## 2025-01-20 NOTE — FLOWSHEET NOTE
01/20/25 0304   Maternal Vitals (MEW-T)   Pulse 82   Heart Rate Source Monitor   Respirations 18   /75   BP Method Automatic   Patient Position Left side   SpO2 97 %   Pulse Oximeter Device Mode Intermittent   Pulse Oximeter Device Location Right;Finger   Level of Consciousness Alert     Nubain administered

## 2025-01-20 NOTE — H&P
Department of Obstetrics and Gynecology   Obstetrics History and Physical        CHIEF COMPLAINT:  contractions, breech presentation    HISTORY OF PRESENT ILLNESS:      The patient is a 34 y.o. female at 38w1d.  OB History          2    Para   1    Term   1       0    AB   0    Living   1         SAB   0    IAB   0    Ectopic   0    Molar   0    Multiple   0    Live Births   1            Patient presents with a chief complaint as above and is being admitted for   without tubal ligation    Estimated Due Date: Estimated Date of Delivery: 25    PRENATAL CARE:    Complicated by: none    PAST OB HISTORY:  OB History          2    Para   1    Term   1       0    AB   0    Living   1         SAB   0    IAB   0    Ectopic   0    Molar   0    Multiple   0    Live Births   1                Past Medical History:    History reviewed. No pertinent past medical history.  Past Surgical History:        Procedure Laterality Date    ARM SURGERY      KNEE SURGERY       Allergies:  Patient has no known allergies.    Social History:    Social History     Socioeconomic History    Marital status:      Spouse name: Not on file    Number of children: Not on file    Years of education: Not on file    Highest education level: Not on file   Occupational History    Not on file   Tobacco Use    Smoking status: Never     Passive exposure: Never    Smokeless tobacco: Never   Vaping Use    Vaping status: Never Used   Substance and Sexual Activity    Alcohol use: Never    Drug use: Never    Sexual activity: Yes     Partners: Male   Other Topics Concern    Not on file   Social History Narrative    Not on file     Social Determinants of Health     Financial Resource Strain: Not on file   Food Insecurity: Not on file   Transportation Needs: Not on file   Physical Activity: Not on file   Stress: Not on file   Social Connections: Not on file   Intimate Partner Violence: Not on file   Housing Stability: Not

## 2025-01-20 NOTE — FLOWSHEET NOTE
01/20/25 0021   Provider Notification   Name of Team Member Notified Leesa ATKINSON   Treatment Team Role Attending Provider   Method of Communication Call   Response In department   Notification Time 0021     Plan to keep pt overnight and reassess at that time unless any changes occur prior.

## 2025-01-20 NOTE — ANESTHESIA PRE PROCEDURE
Department of Anesthesiology  Preprocedure Note       Name:  Kenna Nolen   Age:  34 y.o.  :  1990                                          MRN:  [id number]         Date:  2025      Surgeon: Surgeon(s):  Evelia Avitia MD    Procedure: Procedure(s):   SECTION    Medications prior to admission:   Prior to Admission medications    Medication Sig Start Date End Date Taking? Authorizing Provider   Prenatal Vit-Fe Fumarate-FA (PRENATAL 1+1 PO) Take by mouth   Yes Provider, MD Angle   ibuprofen (ADVIL;MOTRIN) 600 MG tablet Take 1 tablet by mouth every 6 hours as needed for Pain  Patient not taking: Reported on 2025 3/31/23   Nereyda Burnham MD   docusate sodium (COLACE, DULCOLAX) 100 MG CAPS Take 100 mg by mouth 2 times daily  Patient not taking: Reported on 2025 3/31/23   Nereyda Burnham MD       Current medications:    Current Facility-Administered Medications   Medication Dose Route Frequency Provider Last Rate Last Admin    nalbuphine (NUBAIN) injection 10 mg  10 mg IntraVENous Q3H PRN Krysten Landers MD   10 mg at 25 0305    lactated ringers infusion   IntraVENous Continuous Krysten Landers  mL/hr at 25 0839 New Bag at 25 0839    lactated ringers bolus 1,000 mL  1,000 mL IntraVENous Once Krysten Landers MD        sodium chloride flush 0.9 % injection 5-40 mL  5-40 mL IntraVENous 2 times per day Krysten Landers MD        sodium chloride flush 0.9 % injection 10 mL  10 mL IntraVENous PRN Krysten Landers MD        0.9 % sodium chloride infusion   IntraVENous PRN Krysten Landers MD        famotidine (PEPCID) 20 mg in sodium chloride (PF) 0.9 % 10 mL injection  20 mg IntraVENous Once Krysten Landers MD        acetaminophen (TYLENOL) tablet 975 mg  975 mg Oral Once Krysten Lnaders MD        oxytocin (PITOCIN) 30 units in 500 mL infusion  87.3 dm-units/min IntraVENous Continuous PRN Krysten Landers MD        And    oxytocin (PITOCIN) 10 unit bolus from the

## 2025-01-20 NOTE — FLOWSHEET NOTE
01/19/25 2321   Cervical Exam   OB Examiner Leesa ATKINSON     Unchanged.     Orders to place PIV, send admission blood work and initiate IVFs.

## 2025-01-20 NOTE — FLOWSHEET NOTE
pt at 38+0 weeks admitted to T-2 for complaints of contractions.   Pt denies any lof or vag bleeding.   + fetal movement per pt.  MD at bedside. BSUS performed, breech.     SVE performed by MD with pts permission, 3-4 cm bulging membranes.     Per MD, will reassess for progress, no other orders.     VS taken.   FOB remains at bedside for support.

## 2025-01-20 NOTE — PROGRESS NOTES
Risks of proceeding with Primary LTCS including anesthesia, infection, bleeding and injury to other organs not limited to bowel/bladder reviewed.  Patient reported understanding.  Questions answered.    Bedside US performed.  Head in LUQ, back up.

## 2025-01-20 NOTE — FLOWSHEET NOTE
01/20/25 0617   Cervical Exam   Dilation (cm) 4   Effacement 80   Station -3   Station (Labor Curve Graph) 8   OB Examiner Dr. Landers   Membrane/Amniotic Fluid   Membrane Status Intact     Accompanied by Charge RN   C-section called

## 2025-01-20 NOTE — FLOWSHEET NOTE
Pt appears and states to be more uncomfortable.      01/19/25 6679   Provider Notification   Name of Team Member Notified Leesa ATKINSON   Treatment Team Role Attending Provider   Method of Communication Call   Response In department;En route   Notification Time 2318     Called to bedside to reassess.

## 2025-01-20 NOTE — FLOWSHEET NOTE
01/20/25 0246 01/20/25 0250   Fetal Heart Rate   Interventions Other (Comment)  (pt called out asking if she can have the epidural placed. Explained epidural vs spinal placement for a csection. First options for pain managment are stadol/nubain. Pt verbalies understanding. Franki call MD for orders.)  --    Provider Notification   Name of Team Member Notified  --  Leesa ATKINSON   Treatment Team Role  --  Attending Provider   Method of Communication  --  Call   Notification Time  --  0250     MD made aware of above conversation with patient.   MD agrees epidural not appropriate at this point. Orders obtained to administer nubain, per MD SVE is not needed prior to administration.

## 2025-01-21 LAB
DEPRECATED RDW RBC AUTO: 13.5 % (ref 12.4–15.4)
HCT VFR BLD AUTO: 27.8 % (ref 36–48)
HGB BLD-MCNC: 9.9 G/DL (ref 12–16)
MCH RBC QN AUTO: 32.1 PG (ref 26–34)
MCHC RBC AUTO-ENTMCNC: 35.6 G/DL (ref 31–36)
MCV RBC AUTO: 90.1 FL (ref 80–100)
PLATELET # BLD AUTO: 198 K/UL (ref 135–450)
PMV BLD AUTO: 7.3 FL (ref 5–10.5)
RBC # BLD AUTO: 3.08 M/UL (ref 4–5.2)
WBC # BLD AUTO: 13.1 K/UL (ref 4–11)

## 2025-01-21 PROCEDURE — 2500000003 HC RX 250 WO HCPCS: Performed by: OBSTETRICS & GYNECOLOGY

## 2025-01-21 PROCEDURE — 85027 COMPLETE CBC AUTOMATED: CPT

## 2025-01-21 PROCEDURE — 1220000000 HC SEMI PRIVATE OB R&B

## 2025-01-21 PROCEDURE — 6370000000 HC RX 637 (ALT 250 FOR IP): Performed by: OBSTETRICS & GYNECOLOGY

## 2025-01-21 RX ADMIN — Medication 10 ML: at 09:42

## 2025-01-21 RX ADMIN — ACETAMINOPHEN 1000 MG: 500 TABLET ORAL at 22:19

## 2025-01-21 RX ADMIN — MOXIFLOXACIN HYDROCHLORIDE 800 MG: 400 TABLET, FILM COATED ORAL at 00:21

## 2025-01-21 RX ADMIN — FERROUS SULFATE TAB 325 MG (65 MG ELEMENTAL FE) 325 MG: 325 (65 FE) TAB at 17:38

## 2025-01-21 RX ADMIN — MOXIFLOXACIN HYDROCHLORIDE 800 MG: 400 TABLET, FILM COATED ORAL at 09:42

## 2025-01-21 RX ADMIN — PRENATAL WITH FERROUS FUM AND FOLIC ACID 1 TABLET: 3080; 920; 120; 400; 22; 1.84; 3; 20; 10; 1; 12; 200; 27; 25; 2 TABLET ORAL at 09:42

## 2025-01-21 RX ADMIN — FERROUS SULFATE TAB 325 MG (65 MG ELEMENTAL FE) 325 MG: 325 (65 FE) TAB at 09:42

## 2025-01-21 RX ADMIN — ACETAMINOPHEN 1000 MG: 500 TABLET ORAL at 06:40

## 2025-01-21 RX ADMIN — Medication 10 ML: at 22:19

## 2025-01-21 RX ADMIN — ACETAMINOPHEN 1000 MG: 500 TABLET ORAL at 14:23

## 2025-01-21 RX ADMIN — DOCUSATE SODIUM 100 MG: 100 CAPSULE, LIQUID FILLED ORAL at 09:42

## 2025-01-21 RX ADMIN — DOCUSATE SODIUM 100 MG: 100 CAPSULE, LIQUID FILLED ORAL at 22:19

## 2025-01-21 RX ADMIN — MOXIFLOXACIN HYDROCHLORIDE 800 MG: 400 TABLET, FILM COATED ORAL at 17:39

## 2025-01-21 ASSESSMENT — PAIN - FUNCTIONAL ASSESSMENT
PAIN_FUNCTIONAL_ASSESSMENT: ACTIVITIES ARE NOT PREVENTED

## 2025-01-21 ASSESSMENT — PAIN DESCRIPTION - ORIENTATION: ORIENTATION: LOWER;MID

## 2025-01-21 ASSESSMENT — PAIN DESCRIPTION - LOCATION
LOCATION: ABDOMEN;INCISION
LOCATION: ABDOMEN;INCISION
LOCATION: ABDOMEN
LOCATION: ABDOMEN;INCISION
LOCATION: INCISION

## 2025-01-21 ASSESSMENT — PAIN DESCRIPTION - DESCRIPTORS
DESCRIPTORS: DISCOMFORT
DESCRIPTORS: TENDER
DESCRIPTORS: DISCOMFORT;SORE

## 2025-01-21 ASSESSMENT — PAIN SCALES - GENERAL
PAINLEVEL_OUTOF10: 3
PAINLEVEL_OUTOF10: 1
PAINLEVEL_OUTOF10: 1

## 2025-01-21 NOTE — ANESTHESIA POSTPROCEDURE EVALUATION
Department of Anesthesiology  Postprocedure Note    Patient: Kenna Nolen  MRN: [id number]  YOB: 1990  Date of evaluation: 2025    Procedure Summary       Date: 25 Room / Location: [facility] [address] [place] [address]    Anesthesia Start: 0750 Anesthesia Stop: 930    Procedure:  SECTION (Abdomen) Diagnosis:       Normal labor and delivery      (Breech)    Surgeons: Evelia Avitia MD Responsible Provider: Mahesh Dillard MD    Anesthesia Type: spinal ASA Status: 2 - Emergent            Anesthesia Type: No value filed.    Christopher Phase I: Christopher Score: 10    Christopher Phase II: Christopher Score: 10    Anesthesia Post Evaluation    Patient location: chart review.  Level of consciousness: awake  Cardiovascular status: hemodynamically stable  Respiratory status: acceptable  Comments: No apparent complications from neuraxial anesthesia.  Pain management: adequate        No notable events documented.

## 2025-01-21 NOTE — PROGRESS NOTES
Subjective:     Postpartum Day 1:  Delivery    The patient feels well. The patient denies emotional concerns. Pain is well controlled with current medications. The baby iswell. Urinary output is adequate. The patient is ambulating well. The patient is not tolerating a normal diet.     Objective:    VITALS:  /73   Pulse 87   Temp 98.5 °F (36.9 °C) (Axillary)   Resp 16   Ht 1.727 m (5' 8\")   Wt 99.8 kg (220 lb)   SpO2 99%   Breastfeeding Unknown   BMI 33.45 kg/m²     Vitals:    25 0949   BP:    Pulse:    Resp:    Temp:    SpO2: 99%         General:    alert, appears stated age, and cooperative   Bowel Sounds:  active   Lochia:  appropriate   Uterine Fundus:   firm   Incision:  healing well, no significant drainage, no dehiscence, no significant erythema   DVT Evaluation:  No evidence of DVT seen on physical exam.     CBC   Lab Results   Component Value Date    WBC 13.1 (H) 2025    HGB 9.9 (L) 2025    HCT 27.8 (L) 2025    MCV 90.1 2025     2025        Assessment:     Status post  section. Doing well postoperatively.     Plan:     Continue current care.  Desires circ for male infant- reviewed procedure consent obtained     Lindsay Tran MD

## 2025-01-21 NOTE — PROGRESS NOTES
Valderrama pulled per protocol. Hat paced in toilet and patient instructed to urinate into hat and call RN after first void.

## 2025-01-22 VITALS
OXYGEN SATURATION: 99 % | DIASTOLIC BLOOD PRESSURE: 67 MMHG | BODY MASS INDEX: 33.34 KG/M2 | HEIGHT: 68 IN | WEIGHT: 220 LBS | TEMPERATURE: 98.1 F | HEART RATE: 71 BPM | RESPIRATION RATE: 16 BRPM | SYSTOLIC BLOOD PRESSURE: 116 MMHG

## 2025-01-22 PROCEDURE — 6370000000 HC RX 637 (ALT 250 FOR IP): Performed by: OBSTETRICS & GYNECOLOGY

## 2025-01-22 RX ORDER — IBUPROFEN 800 MG/1
800 TABLET, FILM COATED ORAL EVERY 8 HOURS
COMMUNITY
Start: 2025-01-22

## 2025-01-22 RX ORDER — FERROUS SULFATE 325(65) MG
325 TABLET ORAL 2 TIMES DAILY WITH MEALS
COMMUNITY
Start: 2025-01-22

## 2025-01-22 RX ADMIN — PRENATAL WITH FERROUS FUM AND FOLIC ACID 1 TABLET: 3080; 920; 120; 400; 22; 1.84; 3; 20; 10; 1; 12; 200; 27; 25; 2 TABLET ORAL at 09:45

## 2025-01-22 RX ADMIN — ACETAMINOPHEN 1000 MG: 500 TABLET ORAL at 06:19

## 2025-01-22 RX ADMIN — MOXIFLOXACIN HYDROCHLORIDE 800 MG: 400 TABLET, FILM COATED ORAL at 09:46

## 2025-01-22 RX ADMIN — FERROUS SULFATE TAB 325 MG (65 MG ELEMENTAL FE) 325 MG: 325 (65 FE) TAB at 09:45

## 2025-01-22 RX ADMIN — MOXIFLOXACIN HYDROCHLORIDE 800 MG: 400 TABLET, FILM COATED ORAL at 01:44

## 2025-01-22 ASSESSMENT — PAIN SCALES - GENERAL
PAINLEVEL_OUTOF10: 0

## 2025-01-22 NOTE — LACTATION NOTE
This note was copied from a baby's chart.  LACTATION CONSULTATION      Follow-up Consult: Reason for Follow-up: assess needs , provide education, and Non- medical  supplementation      MOB reports infant is breastfeeding well. States that he started cluster feeding early this morning. Requested supplementation for maternal fatigue. Offering breast at time of consult.     Name: Brian Nolen       MRN: [id number]               YOB: 2023   Time of Birth: 8:37 AM   Gestational age: Gestational Age: 38w1d   Birth Weight: Birth Weight: 3.033 kg (6 lb 11 oz) Most Recent Weight: Weight: 2.872 kg (6 lb 5.3 oz)   Weight Change from Birth: -5%            Maternal Assessment:      Maternal Data:   Information for the patient's mother:  Kenna Nolen [[id number]]   32 y.o.   /Para:   Information for the patient's mother:  Kenna Nolen [[id number]]       Information for the patient's mother:  Kenna Nolen [[id number]]   38w1d         Breast Assessment  Right Breast: WDL  Right Nipple: Everts well   Right Areola: WDL   Right Nipple Comfort: comfortable   Right Nipple Integrity: Intact    Left Breast: WDL  Left Nipple: Everts well   Left Areola: WDL   Left Nipple Comfort: comfortable   Left Nipple Integrity: Intact     Infant Assessment:      DOL:Infant 30 hours old.      Feeding: Breastfeeding      Use of Supplementation: Non- medical  due to:  maternal fatigue   Type of Supplementation: Similac Total Care 360   Method of Supplementation: Bottle   Alternative feeding method offered:       Nipple Shield in Use: No  Nipple Shield Size:      I&O adequacy:  Urine output: is established  Stool output: is established  Percent weight change from birthweight: -5%          Birth Factors/Diagnosis that could create risk for breastfeeding:   Non Medical Supplementation  and Early bottle use     Glucose: No       Intervention during consultation:     Interventions Performed:   Assisted with 
This note was copied from a baby's chart.  LACTATION CONSULTATION      Follow-up Consult: Reason for Follow-up: assess needs , provide education, and Provider request      Name: Brian Nolen       MRN: [id number]               YOB: 2023   Time of Birth: 8:37 AM   Gestational age: Gestational Age: 38w1d   Birth Weight: Birth Weight: 3.033 kg (6 lb 11 oz) Most Recent Weight: Weight: 2.77 kg (6 lb 1.7 oz)   Weight Change from Birth: -9%            Maternal Assessment:      Maternal Data:   Information for the patient's mother:  Kenna Nolen [[id number]]   32 y.o.   /Para:   Information for the patient's mother:  Kenna Nolen [[id number]]       Information for the patient's mother:  Kenna Nolen [[id number]]   38w1d         Breast Assessment  Right Breast: Breasts not assessed this encounter     Left Breast: Breasts not assessed this encounter      Infant Assessment:      DOL:2      Feeding: Breastfeeding      Use of Supplementation: Non- medical  due to:  maternal fatigue    Type of Supplementation: Similac Total Care 360   Method of Supplementation: Bottle      Nipple Shield in Use: No     I&O adequacy:  Urine output: is established  Stool output: is established  Percent weight change from birthweight: -9%     Oral Assessment:   Oral assessment not completed at this time.     Birth Factors/Diagnosis that could create risk for breastfeeding:   Non Medical Supplementation , Early bottle use , and Early Term     Glucose:No     Intervention during consultation:     Interventions Performed:   Education     Latch & Positioning: MOB reports infant fed around 9:15 a and did well. Reports was sleepy overnight. RN at bedside and will likely have circumcision soon. Encouraged to call for lactation support when infant returns to room to assist with latching.     Manual Expression:  MOB states she understands     Bedside Breast Pump:   N/A    Breast Shield Size:   N/A    Amount 
This note was copied from a baby's chart.  Lactation Progress Note      Data:    F/U consult for multip experienced breast feeder on day 1 po with an infant born at 38.1 weeks gestation. MOB breast fed her first for 10 months w/o complication. Reports current infant has been latching and feeding well. At time of consult infant had just come off the breast.    Urine output:   established   Stool output:   established  Percent weight change from birth:  -5%         Action:    Introduced self & ensured name & lactation # is on whiteboard in room. Introduced self & ensured name & lactation # is on whiteboard in room. Reviewed breastfeeding education; what to expect with infant feedings, infant output & how to know infant is getting enough, reinforced the importance of a deep latch and how to achieve it, how to break suction and try again if latch is shallow, normal  behavior, how to wake a sleepy infant to feed, how the breasts work to make milk & protecting milk supply, what to expect with cluster feeding, how to hand express colostrum, and breast care.     Reviewed infant feeding cues and encouraged mother to allow infant to breast feed on demand anytime feeding cues are shown and if no feeding cues are shown to attempt to wake infant to feed every 2-3 hours. If infant is still too sleepy to latch to hand express colostrum into infants mouth for about ten minutes, then try again in 2-3 hours. After the first day of life to breast feed a minimum of 8-12 times a day per 24 hour period.     Also encouraged mother to avoid giving infant a pacifier, bottle, or pump for at least the first two weeks of life or until breast feeding is well established. Encouraged good hydration, nutrition, and rest, and to keep taking prenatal or multivitamin while lactating. Encouraged much skin to skin between mother and infant and father and infant. Breast feeding log reviewed, all questions answered. Mother encouraged to call 
This note was copied from a baby's chart.  Lactation Progress Note      Data:   Follow up with multip breast feeder who had requested supplementing last night and additional bottles this evening. Mother states baby has been feeding often and latching has been going well. Reports occasional tenderness but current attachment is comfortable. Infant latched in cross cradle hold upon entry to room.    Action: Introduced self, number on whiteboard. Reinforced education regarding clusterfeeding and stressed importance of deep, comfortable latch and ways to achieve this. Discussed how breast support may be helpful if infant is slipping off nipple. Reminded how to break suction to relatch if initial soreness does not resolve after a few seconds of feeding. Also discussed assessing nipple for creasing or pinched appearance. Mother reminded that there is not a medical need for supplementation at this time and that frequent feeding tonight and tomorrow is important in milk production. Discussed complications of supplementing and not stimulating breasts. Offered pump set up if she plans to continue any supplementing but declines at this time.     Response: Mother plans to continue to feed at breast. Seems fatigued at time of visit but has positive outlook on how feeding has been going. Encouraged to call for follow up.  
Screen, Urine   Date Value Ref Range Status   2025 Neg Negative <300 ng/mL Final     Methadone Screen, Urine   Date Value Ref Range Status   2025 Neg Negative <300 ng/mL Final     pH, Urine   Date Value Ref Range Status   2025 6.0  Final     Comment:     Urine pH less than 5.0 or greater than 8.0 may indicate sample adulteration.  Another sample should be collected if clinically  indicated.       pH, UA   Date Value Ref Range Status   2023 5.0  Final     Comment:     Urine pH less than 5.0 or greater than 8.0 may indicate sample adulteration.  Another sample should be collected if clinically  indicated.       Drug Screen Comment:   Date Value Ref Range Status   2025 see below  Final     Comment:     This method is a screening test to detect only these drug  classes as part of a medical workup.  Confirmatory testing  by another method should be ordered if clinically indicated.         Other significant maternal history:     Delivery Information:  Born on 2023 at 8:37 AM  Delivery method: , Low Transverse [251]  Additional Information:  Forceps attempted? No [0]  Vacuum extractor attempted? No [0]  Breech:   Complications:     Infant Assessment:    DOL: Infant born at 0837  Feeding: Breastfeeding     I&O adequacy:  Urine output: is established  Stool output: is established  Percent weight change from birthweight: 0%    Oral Assessment Did not complete full assessment at this time as infant is sts at left breast attempting to latch.    Birth Factors/Diagnosis that could create risk for breastfeeding:   Early separation from mother About 2 hours to in SCN after birth to monitor infant for respiratory distress    Glucose: No      Intervention During Consultation    Interventions Performed:   [facility] Breastfeeding Booklet Provided , Assisted with breastfeeding , Education , and Skin to skin     Latch & Positioning: MOB is responds to commands but unable to keep eyes open to

## 2025-01-22 NOTE — DISCHARGE INSTRUCTIONS
reduce swelling.  Avoid lifting anything heavier than your baby or a gallon of milk.  Avoid driving for two weeks or while taking narcotics.  No sexual intercourse for 6 weeks, or until advised by your OB provider. Nothing in the vagina: intercourse, tampons or douching.  Be prepared to discuss family planning at your follow up OB visit.  If you feel tired and have a lack of energy, you may continue to take your prenatal vitamins. Nap when your baby naps to catch up on sleep.    EMOTIONS    You may feel cantu, sad, teary and overwhelmed. Contact your OB provider if you think you may be showing signs of postpartum depression.     If your baby will not stop crying, contact another adult to help or place the baby in its crib on its back and take a break. Never shake your baby!    INCISIONAL/CIPRIANO CARE    Clean your incision in the shower with mild soap. After shower pat the incision area dry and allow it to be open to the air.  If used, steri strips should be removed by 2 weeks.  If you are discharged with staples in place, call your OB provider's office to schedule removal.  Vaginal bleeding will decrease in amount over the next few weeks. Cleanse your perineum from front to back using the cipriano bottle, after toileting, until bleeding stops.  You will notice that as your activity increases, your flow may also increase. This is a sign that you need to rest more often. Call your OB provider if you are saturating more than one maxi pad in an hour and rest does not help.    BREAST CARE    FOR BREASTFEEDING MOMS:    If you become engorged, feeding may be more difficult or painful for 1 to 2 days. You may find it helpful to hand express some milk so that the infant can latch more easily.  While breastfeeding continue to take your prenatal vitamins as directed.  Refer to the breastfeeding information in the discharge binder.    FOR NON-BREASTFEEDING MOMS:    You may apply ice packs to your breasts, over your bra, for 20

## 2025-01-22 NOTE — PROGRESS NOTES
Department of Obstetrics and Gynecology  Labor and Delivery  Attending Post Partum Progress Note      SUBJECTIVE:  Pt without complaints, pain controlled, tolerating po, lochia wnl,  the patient is currently breastfeeding. positive flatus.    OBJECTIVE:      Vitals:  Vitals:    25 0624   BP: 119/70   Pulse: 74   Resp: 16   Temp: 98 °F (36.7 °C)   SpO2:        RRR CTAB  ABDOMEN:  soft, non-distended, non-tender, + BS  FF below umbilicus  Incision: c/d/i with steri-strips  EXT: no edema    DATA:    CBC:    Lab Results   Component Value Date/Time    WBC 13.1 2025 06:22 AM    HGB 9.9 2025 06:22 AM    HCT 27.8 2025 06:22 AM     2025 06:22 AM       ASSESSMENT & PLAN:    34 y.o.   OB History          2    Para   2    Term   2       0    AB   0    Living   2         SAB   0    IAB   0    Ectopic   0    Molar   0    Multiple   0    Live Births   2             s/p C/S pod# 2  1. Doing well, continue routine post-op care.  2.  Anemia:  PNV + Fe.  3.  Desires male infant circumcised.  Procedure reviewed.    4.  Desires discharge.  Discharge instructions reviewed.

## 2025-01-22 NOTE — PLAN OF CARE
Problem: Pain  Goal: Verbalizes/displays adequate comfort level or baseline comfort level  2025 184 by Krysten Kam RN  Outcome: Progressing  Flowsheets (Taken 2025 1539)  Verbalizes/displays adequate comfort level or baseline comfort level:   Encourage patient to monitor pain and request assistance   Assess pain using appropriate pain scale   Administer analgesics based on type and severity of pain and evaluate response   Implement non-pharmacological measures as appropriate and evaluate response  2025 1026 by Nancy Mckee RN  Outcome: Progressing     Problem: Vaginal Birth or  Section  Goal: Fetal and maternal status remain reassuring during the birth process  Description:  Birth OB-Pregnancy care plan goal which identifies if the fetal and maternal status remain reassuring during the birth process  Outcome: Progressing     Problem: Postpartum  Goal: Experiences normal postpartum course  Description:  Postpartum OB-Pregnancy care plan goal which identifies if the mother is experiencing a normal postpartum course  2025 by Krysten Kam RN  Outcome: Progressing  2025 1026 by Nancy Mckee RN  Outcome: Progressing  Goal: Appropriate maternal -  bonding  Description:  Postpartum OB-Pregnancy care plan goal which identifies if the mother and  are bonding appropriately  2025 by Krysten Kam RN  Outcome: Progressing  2025 1026 by Nancy Mckee RN  Outcome: Progressing  Goal: Establishment of infant feeding pattern  Description:  Postpartum OB-Pregnancy care plan goal which identifies if the mother is establishing a feeding pattern with their   Outcome: Progressing  Goal: Incisions, wounds, or drain sites healing without S/S of infection  2025 by Krysten Kam RN  Outcome: Progressing  2025 1026 by Nancy Mckee RN  Outcome: Progressing     Problem: Infection - Adult  Goal: Absence of infection at 
  Problem: Pain  Goal: Verbalizes/displays adequate comfort level or baseline comfort level  Outcome: Progressing     Problem: Postpartum  Goal: Experiences normal postpartum course  Description:  Postpartum OB-Pregnancy care plan goal which identifies if the mother is experiencing a normal postpartum course  Outcome: Progressing  Goal: Appropriate maternal -  bonding  Description:  Postpartum OB-Pregnancy care plan goal which identifies if the mother and  are bonding appropriately  Outcome: Progressing  Goal: Establishment of infant feeding pattern  Description:  Postpartum OB-Pregnancy care plan goal which identifies if the mother is establishing a feeding pattern with their   Outcome: Progressing  Goal: Incisions, wounds, or drain sites healing without S/S of infection  Outcome: Progressing     Problem: Infection - Adult  Goal: Absence of infection at discharge  Outcome: Progressing  Goal: Absence of infection during hospitalization  Outcome: Progressing  Goal: Absence of fever/infection during anticipated neutropenic period  Outcome: Progressing     Problem: Safety - Adult  Goal: Free from fall injury  Outcome: Progressing     Problem: Discharge Planning  Goal: Discharge to home or other facility with appropriate resources  Outcome: Progressing     Problem: Chronic Conditions and Co-morbidities  Goal: Patient's chronic conditions and co-morbidity symptoms are monitored and maintained or improved  Outcome: Progressing     Problem: Cardiovascular - Adult  Goal: Maintains optimal cardiac output and hemodynamic stability  Description: INTERVENTIONS:.  -Monitor blood pressure and heart rate  -Monitor urine output and notify licensed practitioner for values outside of   -Administer fluid and /or volume expanders as ordered  -Administer vasoactive or antifibrinolytic medications as ordered  -Monitor labs and assess for signs and symptoms of volume excess or deficit  -Monitor response to 
- Adult  Goal: Absence of infection at discharge  1/20/2025 2040 by Minna Leroy RN  Outcome: Progressing  1/20/2025 1840 by Krysten Kam RN  Outcome: Progressing  Goal: Absence of infection during hospitalization  1/20/2025 2040 by Minna Leroy RN  Outcome: Progressing  1/20/2025 1840 by Krysten Kam RN  Outcome: Progressing  1/20/2025 1026 by Nancy Mckee RN  Outcome: Progressing  Goal: Absence of fever/infection during anticipated neutropenic period  1/20/2025 1840 by Krysten Kam RN  Outcome: Progressing     Problem: Safety - Adult  Goal: Free from fall injury  1/20/2025 2040 by Minna Leroy RN  Outcome: Progressing  1/20/2025 1840 by Krysten Kam RN  Outcome: Progressing  1/20/2025 1026 by Nancy Mckee RN  Outcome: Progressing     Problem: Discharge Planning  Goal: Discharge to home or other facility with appropriate resources  1/20/2025 2040 by Minna Leroy RN  Outcome: Progressing  1/20/2025 1840 by Krysten Kam RN  Outcome: Progressing     Problem: Chronic Conditions and Co-morbidities  Goal: Patient's chronic conditions and co-morbidity symptoms are monitored and maintained or improved  1/20/2025 1840 by Krysten Kam RN  Outcome: Progressing  1/20/2025 1026 by Nanyc Mckee RN  Outcome: Progressing     Problem: Cardiovascular - Adult  Goal: Maintains optimal cardiac output and hemodynamic stability  Description: INTERVENTIONS:.  -Monitor blood pressure and heart rate  -Monitor urine output and notify licensed practitioner for values outside of   -Administer fluid and /or volume expanders as ordered  -Administer vasoactive or antifibrinolytic medications as ordered  -Monitor labs and assess for signs and symptoms of volume excess or deficit  -Monitor response to interventions for patient's volume status, including labs, urine output  -Assess for signs and symptoms of bleeding or hemorrhage  -Monitor labs for bleeding or clotting disorders as ordered  Administer blood

## 2025-01-22 NOTE — DISCHARGE SUMMARY
Department of Obstetrics and Gynecology  Postpartum Discharge Summary      Admit Date: 2025    Admit Diagnosis:     Discharge Date: 25    Condition at Discharge: stable    Discharge Diagnoses: primary C section    Discharge Disposition:  Home    Service: Obstetrics    Postpartum complications: none     Hospital Course: uncomplicated     Data:  Delivery Date:   2025  8:37 AM    Weight   Information for the patient's :  Brian Nolen [[id number]]   Birth Weight: 3.033 kg (6 lb 11 oz)  Apgars   Information for the patient's :  Brian Nolen [[id number]]   APGAR One: 4   Apgars   Information for the patient's :  Brian Nolen [[id number]]   APGAR Five: 8  Disposition of Baby:  Home with mother    Current Discharge Medication List        START taking these medications    Details   ferrous sulfate (IRON 325) 325 (65 Fe) MG tablet Take 1 tablet by mouth 2 times daily (with meals)           CONTINUE these medications which have CHANGED    Details   ibuprofen (ADVIL;MOTRIN) 800 MG tablet Take 1 tablet by mouth in the morning and 1 tablet at noon and 1 tablet in the evening.           CONTINUE these medications which have NOT CHANGED    Details   Prenatal Vit-Fe Fumarate-FA (PRENATAL 1+1 PO) Take by mouth           STOP taking these medications       docusate sodium (COLACE, DULCOLAX) 100 MG CAPS Comments:   Reason for Stopping:               Follow-up: postpartum appt in  6 weeks.         Electronically signed by Evelia Avitia MD on 2025 at 8:20 AM

## (undated) DEVICE — SUTURE VICRYL SZ 3-0 L36IN ABSRB UD L36MM CT-1 1/2 CIR J944H

## (undated) DEVICE — DRESSING COMP IS W4XL10IN PD W2XL8IN CNTCT LAYR ADH

## (undated) DEVICE — BLADE CLIPPER GEN PURP NS

## (undated) DEVICE — GARMENT COMPR L FOR 23IN CALF FLOTRN

## (undated) DEVICE — SUTURE VICRYL SZ 0 L36IN ABSRB UD L36MM CT-1 1/2 CIR J946H

## (undated) DEVICE — Device

## (undated) DEVICE — TRAY URIN CATH 16FR DRNGE BG STATLOK STBL DEV F SURSTP

## (undated) DEVICE — GLOVE SURG SZ 65 THK91MIL LTX FREE SYN POLYISOPRENE

## (undated) DEVICE — 3M™ STERI-STRIP™ COMPOUND BENZOIN TINCTURE 40 BAGS/CARTON 4 CARTONS/CASE C1544: Brand: 3M™ STERI-STRIP™

## (undated) DEVICE — PAD,NON-ADHERENT,3X8,STERILE,LF,1/PK: Brand: MEDLINE

## (undated) DEVICE — S/USE RESUS KIT W/O MASK (10): Brand: FISHER & PAYKEL HEALTHCARE

## (undated) DEVICE — SUTURE VICRYL SZ 0 L36IN ABSRB UD CT-1 L36MM 1/2 CIR TAPR PNT VCP946H

## (undated) DEVICE — SUTURE ABSORBABLE BRAIDED 2-0 CT-1 27 IN UD VICRYL J259H

## (undated) DEVICE — SUTURE VICRYL COAT SZ 4-0 L18IN ABSRB UD L19MM PS-2 1/2 CIR J496G

## (undated) DEVICE — Z INACTIVE NO ACTIVE SUPPLIER APPLICATOR MEDICATED 26 CC TINT HI-LITE ORNG STRL CHLORAPREP